# Patient Record
Sex: MALE
[De-identification: names, ages, dates, MRNs, and addresses within clinical notes are randomized per-mention and may not be internally consistent; named-entity substitution may affect disease eponyms.]

---

## 2018-07-16 ENCOUNTER — HOSPITAL ENCOUNTER (OUTPATIENT)
Dept: HOSPITAL 5 - CT | Age: 63
Discharge: HOME | End: 2018-07-16
Attending: SURGERY
Payer: MEDICAID

## 2018-07-16 DIAGNOSIS — I70.213: Primary | ICD-10-CM

## 2018-07-16 LAB — BUN SERPL-MCNC: 15 MG/DL (ref 9–20)

## 2018-07-16 PROCEDURE — 82565 ASSAY OF CREATININE: CPT

## 2018-07-16 PROCEDURE — 36415 COLL VENOUS BLD VENIPUNCTURE: CPT

## 2018-07-16 PROCEDURE — 84520 ASSAY OF UREA NITROGEN: CPT

## 2018-07-16 PROCEDURE — 75635 CT ANGIO ABDOMINAL ARTERIES: CPT

## 2018-07-16 NOTE — CAT SCAN REPORT
FINAL REPORT



EXAM:  CT ANGIO ABD/FEMORAL ABD AORTA



HISTORY:  ATHEROSCLEROSIS OF NATIVE ARTERIES OF EXTREMITIES WITH 



TECHNIQUE:  CT angiography of the abdomen/pelvis with CT arterial

runoff of lower extremities. IV contrast was administered. Axial

images and coronal and sagittal reformatted images were obtained.



PRIORS:  None.



FINDINGS:  

There are coronary artery atherosclerotic calcifications.

There is some dependent atelectasis at the lung bases.

The visualized liver, spleen, pancreas, adrenal glands and

kidneys demonstrate no significant abnormalities.

There is no evidence of intestinal obstruction.

The appendix is not specifically identified. 





There are aortoiliac atherosclerotic calcifications.

There is infrarenal aortic ectasia with mural thrombus. This

measures only 2.7 cm maximum diameter. 



There are bilateral iliac artery stents. 



There are iliofemoral atherosclerotic calcifications. There is

some stenosis in the left common femoral artery. There is no

significant flow within the right superficial femoral artery.

Flow in the right lower extremity is seen throughout collateral

vessels via the deep femoral artery. There is reconstituted flow

in the lower popliteal artery and into the right calf

trifurcation. Adequate flow is seen within dorsalis pedis,

posterior tibial and peroneal arteries of the right lower

extremity.



On the left side there is patency of a left femoral popliteal

graft. The native femoral artery is atherosclerotic and occluded.

Flow at the calf arterial trifurcation is attenuated. Flow within

anterior tibial and peroneal arteries is diminished but flow in

the left posterior tibial artery is maintained. There is little

flow seen in the dorsalis pedis. 



IMPRESSION:  

Aortoiliac and lower extremity atherosclerotic disease.



Right-sided occluded superficial femoral artery with

reconstitution for 2 did flow in the popliteal artery calf

arteries via deep femoral arterial collaterals. 



Left-sided fem-pop graft is patent. Atherosclerotic disease

involving native calf vessels with poor flow in anterior tibial

and peroneal arteries. Left posterior tibial artery flow is

maintained.

## 2018-08-09 ENCOUNTER — HOSPITAL ENCOUNTER (OUTPATIENT)
Dept: HOSPITAL 5 - CATHLABREC | Age: 63
Discharge: HOME | End: 2018-08-09
Attending: SURGERY
Payer: MEDICAID

## 2018-08-09 VITALS — SYSTOLIC BLOOD PRESSURE: 130 MMHG | DIASTOLIC BLOOD PRESSURE: 78 MMHG

## 2018-08-09 DIAGNOSIS — E78.00: ICD-10-CM

## 2018-08-09 DIAGNOSIS — I10: ICD-10-CM

## 2018-08-09 DIAGNOSIS — Z95.1: ICD-10-CM

## 2018-08-09 DIAGNOSIS — Z98.890: ICD-10-CM

## 2018-08-09 DIAGNOSIS — Z79.01: ICD-10-CM

## 2018-08-09 DIAGNOSIS — I70.212: Primary | ICD-10-CM

## 2018-08-09 DIAGNOSIS — F32.9: ICD-10-CM

## 2018-08-09 DIAGNOSIS — Z95.0: ICD-10-CM

## 2018-08-09 DIAGNOSIS — F10.10: ICD-10-CM

## 2018-08-09 DIAGNOSIS — Z72.89: ICD-10-CM

## 2018-08-09 DIAGNOSIS — I25.10: ICD-10-CM

## 2018-08-09 DIAGNOSIS — E78.5: ICD-10-CM

## 2018-08-09 DIAGNOSIS — Z83.3: ICD-10-CM

## 2018-08-09 DIAGNOSIS — I25.2: ICD-10-CM

## 2018-08-09 DIAGNOSIS — Z82.49: ICD-10-CM

## 2018-08-09 DIAGNOSIS — Z79.899: ICD-10-CM

## 2018-08-09 DIAGNOSIS — Z90.49: ICD-10-CM

## 2018-08-09 DIAGNOSIS — F17.210: ICD-10-CM

## 2018-08-09 LAB
APTT BLD: 30.1 SEC. (ref 24.2–36.6)
BASOPHILS # (AUTO): 0 K/MM3 (ref 0–0.1)
BASOPHILS NFR BLD AUTO: 0.6 % (ref 0–1.8)
BUN SERPL-MCNC: 17 MG/DL (ref 9–20)
BUN/CREAT SERPL: 24 %
CALCIUM SERPL-MCNC: 9.1 MG/DL (ref 8.4–10.2)
EOSINOPHIL # BLD AUTO: 0.2 K/MM3 (ref 0–0.4)
EOSINOPHIL NFR BLD AUTO: 2.6 % (ref 0–4.3)
HCT VFR BLD CALC: 49.6 % (ref 35.5–45.6)
HEMOLYSIS INDEX: 8
HGB BLD-MCNC: 17.3 GM/DL (ref 11.8–15.2)
INR PPP: 0.95 (ref 0.87–1.13)
LYMPHOCYTES # BLD AUTO: 2.9 K/MM3 (ref 1.2–5.4)
LYMPHOCYTES NFR BLD AUTO: 36.5 % (ref 13.4–35)
MCH RBC QN AUTO: 31 PG (ref 28–32)
MCHC RBC AUTO-ENTMCNC: 35 % (ref 32–34)
MCV RBC AUTO: 90 FL (ref 84–94)
MONOCYTES # (AUTO): 0.6 K/MM3 (ref 0–0.8)
MONOCYTES % (AUTO): 7.4 % (ref 0–7.3)
PLATELET # BLD: 146 K/MM3 (ref 140–440)
RBC # BLD AUTO: 5.51 M/MM3 (ref 3.65–5.03)

## 2018-08-09 PROCEDURE — 85610 PROTHROMBIN TIME: CPT

## 2018-08-09 PROCEDURE — C1887 CATHETER, GUIDING: HCPCS

## 2018-08-09 PROCEDURE — 85730 THROMBOPLASTIN TIME PARTIAL: CPT

## 2018-08-09 PROCEDURE — C1894 INTRO/SHEATH, NON-LASER: HCPCS

## 2018-08-09 PROCEDURE — C1769 GUIDE WIRE: HCPCS

## 2018-08-09 PROCEDURE — 36415 COLL VENOUS BLD VENIPUNCTURE: CPT

## 2018-08-09 PROCEDURE — 37220: CPT

## 2018-08-09 PROCEDURE — 76937 US GUIDE VASCULAR ACCESS: CPT

## 2018-08-09 PROCEDURE — 80048 BASIC METABOLIC PNL TOTAL CA: CPT

## 2018-08-09 PROCEDURE — C1725 CATH, TRANSLUMIN NON-LASER: HCPCS

## 2018-08-09 PROCEDURE — 75710 ARTERY X-RAYS ARM/LEG: CPT

## 2018-08-09 PROCEDURE — 75625 CONTRAST EXAM ABDOMINL AORTA: CPT

## 2018-08-09 PROCEDURE — 85025 COMPLETE CBC W/AUTO DIFF WBC: CPT

## 2018-08-09 RX ADMIN — FENTANYL CITRATE ONE MCG: 50 INJECTION, SOLUTION INTRAMUSCULAR; INTRAVENOUS at 14:50

## 2018-08-09 RX ADMIN — FENTANYL CITRATE ONE MCG: 50 INJECTION, SOLUTION INTRAMUSCULAR; INTRAVENOUS at 15:03

## 2018-08-09 RX ADMIN — MIDAZOLAM ONE MG: 1 INJECTION INTRAMUSCULAR; INTRAVENOUS at 14:50

## 2018-08-09 RX ADMIN — MIDAZOLAM ONE MG: 1 INJECTION INTRAMUSCULAR; INTRAVENOUS at 15:03

## 2018-08-09 NOTE — HISTORY AND PHYSICAL REPORT
History of Present Illness


Date of examination: 18


History of present illness: 








Dominic Hunt





Date of visit:  2018


:  1955    Age:  63 yrs.


Medical record number:  [id number]    


Account number:  [id number]


___


CHIEF COMPLAINTS


 Followup of Peripheral Vascular Disease, Unspecified


___


ALLERGIES


 No Known Drug Allergies


___


MEDICATIONS


 1. atorvastatin 80 mg tablet, 1 by mouth daily


 2. bupropion HCl  mg 24 hr tablet, extended release, 1 by mouth daily


 3. diphenhydramine 50 mg capsule, 2 by mouth daily


 4. hydrochlorothiazide 25 mg tablet, 1 by mouth daily


 5. lisinopril 10 mg tablet, 1 by mouth daily


 6. metoprolol succinate  mg tablet,extended release 24 hr, 1 by mouth 

daily


 7. mirtazapine 30 mg tablet, 1 by mouth daily


 8. phenytoin sodium extended 200 mg capsule, 1 by mouth twice daily


 9. Seroquel 200 mg tablet, 1 by mouth daily


 10. Vimpat 200 mg tablet, 1 by mouth daily in the am and 300mg at night


___


DIAGNOSTIC TEST DATES


 (2017) nuclear study   Note:  moderate inferior infarct, minimal 

inferoapical reji-infarct ischemia 46477


 (2016) echocardiogram   Note:  EF 49%, mild AI, 28031


__


HISTORY OF PRESENT ILLNESS


Follow up CTA


Still has severe pain in left buttocks radiating to thigh, describes as 

disabling


Prior multiple vascular procedures in [place]


Minimal right calf pain with walking


___


REVIEW OF SYSTEMS


General/Constitutional:  denies recent weight loss, denies recent weight gain, 

denies fever, denies chills, denies change in exercise tolerance


Integumentary:  denies change in hair or nails, denies rashes, denies skin 

lesions


Eyes:  denies diplopia, denies visual field defects, denies blurred vision, 

denies eye pain, denies discharge


Ears, Nose, Mouth, Throat:  denies hearing loss, denies epistaxis, denies 

hoarseness, denies difficulty speaking


Respiratory:  denies dyspnea, denies cough, denies wheezing, denies hemoptysis, 

denies orthopnea, denies PND


Cardiovascular:  denies palpitations, denies chest pain, denies peripheral edema

, denies syncope, denies claudication


Gastrointestinal:  denies ulcer disease, denies hematochezia and denies melena


Musculoskeletal:  denies venous insufficiency, denies arthritic symptoms, 

denies back problems


Neurological:  seizure disorder


Psychiatric:  depression


Endocrine:  denies heat/cold intolerance, denies polydipsia, denies polyuria


Hematological/Immunologic:  denies bleeding disorder


___


PAST HISTORY 


Past Medical Illnesses:  seizures, hx of depression;  Cardiovascular Illnesses: 

 hyperlipidemia, hypertension, CABG . AICD  for VT, PVD bilateral fem-

pop ;  Infectious Diseases:  no significant history of infectious disease;  

Surgical Procedures:  triple bypass , pacemaker , pad bypass  (both 

legs);  Trauma History:  no history of significant physical trauma;  NYHA 

Classification:  I;  Cardiology Procedures-Noninvasive:  EKG May 2017, lexiscan 

perfusion study 2017;  Device Implants:  AICD Medtronic Sprint 

Quattro 11/29/10;  Left Ventricular Ejection Fraction:  LVEF of 50% documented 

via nuclear study on 2017


;  Peripheral Vascular Procedures:  bilateral fem-pop ;  ___


CARDIAC RISK FACTOR SCREENING 


Tobacco Abuse: Yes;  Family History of Heart Disease: No;  Hyperlipidemia: Yes;

  Hypertension: Yes;  Diabetes Mellitus: No;  Prior History of Heart Disease: No

;  Obesity: No;  Sedentary Life Style: No;  Age: Yes;  Postmenopausal Female: No

;  


___


SOCIAL HISTORY


Alcohol Use:  history of alcohol abuse


Smoking/Tobacco Use:   smokes cigarettes 1-2 ppd 


Diet:  regular diet without modifications


Caffeine Use:  2-3 pots of coffee per day


Exercise:  no routine exercise program


Illicit Drug Use:  denies the abuse of prescription or nonprescription drugs





PHYSICAL EXAMINATION





VITAL SIGNS:


Blood Pressure-  120/70 Sitting, Left arm, large cuff


Pulse-                    68/min.


Respirations-       16/min.


Weight-                 227.58420 lbs.


Height-                  75.00"


BMI:                        28











Constitutional:  cooperative, alert and oriented, well developed, well nourished

, in no acute distress


Skin:  warm and dry to touch, no apparent skin lesions, no apparent masses noted


Head:  normocephalic, non tender, no palpable masses


Eyes:  EOMS Intact, conjunctivae and lids unremarkable


ENT:  ears unremarkable, good dentition


Neck:  no palpable masses or adenopathy, no thyromegaly, JVP normal, carotid 

pulses are full and equal bilaterally without bruits


Chest:  normal symmetry, no tenderness to palpation, normal respiratory 

excursion, no intercostal retraction, no use of accessory muscles, normal 

diaphragmatic excursion, clear to auscultation


Cardiac:  regular rhythm, S1 normal, S2 normal, no S3 or S4, no murmurs, no 

gallops, no rubs detected


Abdomen:   soft, bowel sounds normoactive, no masses, non-tender


Peripheral Pulses:  nonpalpable left femoral pulse, palpable right femoral 

pulse 2+


Extremities & Back:  both feet warm, well-perfused, no edema


Psychiatric:  appropriate mood, memory and judgment


Neurological:  no gross motor or sensory deficits noted


___


IMPRESSIONS/PLAN





1. Peripheral Vascular Disease, Unspecified


2. Nicotine Dependence, Cigarettes, With Other Nicotine-induced Disorders


3. Atherosclerosis Of Native Arteries Of Extremities With Intermittent 

Claudication, Bilateral Legs


4. Essential (primary) Hypertension


5. Atherosclerosis Of Coronary Artery Bypass Graft(s) Without Angina Pectoris


6. Presence Of Automatic (implantable) Cardiac Defibrillator


7. Atherosclero of native arter of extrem rest pain solitario legs





Left butock severe claudication:  No femoral pulse, c/w aortoiliac disease on 

left.  CTA shows diffuse iliac disease, based on exam this is causing his 

symptoms on left.  Scheduled left leg angiogram, d/w him risks/benefits/

alternatives (meds only), will proceed with procedure at [facility].


___


TODAYS ORDERS


 1. Patient Electronic Access, Today


 2. Left Lower Angiogram, 3 days, I70.223


                     


___


Clinic Physician:  Domo Meehan MD MD





Referring Physician: TAYLOR AGUIRRE








Medications and Allergies


 Allergies











Allergy/AdvReac Type Severity Reaction Status Date / Time


 


No Known Allergies Allergy   Unverified 18 08:38

## 2018-08-10 NOTE — PROCEDURE NOTE
Date of procedure: 08/09/18


Pre-op diagnosis: Left Hip Severe Claudication, H/o Iliac stenting


Post-op diagnosis: same


Procedure: 





1.  Ultrasound-guided access of right CFA


2.  Abdominal aortogram with left lower extremity runoff


3.  Balloon angioplasty of left JUANJOSE in-stent stenosis with 8 mm balloon.





Description:





The risks, benefits, complications, treatment options, and expected outcomes 

were discussed with the patient. The patient concurred with the proposed plan, 

giving informed consent. Patient was brought to the cath lab after IV hydration 

was begun. The patient was sedated with Fentanyl and Versed and then prepped 

and draped in the usual manner.  Under ultrasound guidance and using the 

modified Seldinger access technique, I gained access to the common femoral 

artery and this was eventually upsized to a 6F Armenian sheath.  Omniflush 

Catheter was placed in the abdominal aorta over wire and an aortogram was 

performed.  Runoff pictures were obtained by selecting the contralateral iliac 

system with Bentson wire and Omniflush catheter.  This showed the above.  I 

placed an advantage wire down the lower extremity and placed a 6F Destination 

sheath over the bifurcation then gave the patient 5000 U IV heparin.  I then 

crossed the lesion with wire and glidecatheter and confirmed I was intraluminal 

with an angiogram.  I then treated the lesion with a 8 mm balloon at nominal 

pressure for 1 minute.  Repeat angiogram showed a good result and no 

embolization.    I did a sheath angiogram of the contralateral side which 

confirmed a good access location and the sheath was removed with manual 

pressure applied for 20 minutes.  There was no bleeding afterwards.  The 

patient tolerated the procedure well and there were no complications.





Anesthesia: MAC, local


Surgeon: DEWAYNE ROWE


Estimated blood loss: minimal


Pathology: none


Condition: stable


Disposition: observation

## 2022-06-23 ENCOUNTER — HOSPITAL ENCOUNTER (OUTPATIENT)
Dept: HOSPITAL 5 - XRAY | Age: 67
Discharge: HOME | End: 2022-06-23
Attending: ORTHOPAEDIC SURGERY
Payer: MEDICARE

## 2022-06-23 DIAGNOSIS — S42.92XS: ICD-10-CM
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
## 2022-06-23 DIAGNOSIS — S42.292P: Primary | ICD-10-CM

## 2022-06-23 NOTE — XRAY REPORT
LEFT SHOULDER 3 VIEWS



INDICATION / CLINICAL INFORMATION: S42.92XA FRACTURE OF LEFT SHOULDER GIRDLE S42.92XS.



COMPARISON: None available.

 

FINDINGS:



BONES / JOINT(S): There is a chronic appearing comminuted fracture of the left humeral head and neck.
 There is a separate fracture fragment involving the superior half of the humeral head with lateral r
otation present. There is significant overriding of the distal fracture fragments. There is at least 
incomplete osseous union present. No dislocation.

SOFT TISSUES: No significant abnormality.



ADDITIONAL FINDINGS: The visualized left lung is clear. There is a median sternotomy.



IMPRESSION: Chronic comminuted fractures of the humeral head and neck with incomplete union.



Signer Name: Seymour Perez MD 

Signed: 6/23/2022 10:15 AM

Workstation Name: [id number]

## 2022-08-10 LAB
BUN SERPL-MCNC: 14 MG/DL (ref 9–20)
BUN/CREAT SERPL: 18 %
CALCIUM SERPL-MCNC: 9 MG/DL (ref 8.4–10.2)
HCT VFR BLD CALC: 47.4 % (ref 35.5–45.6)
HEMOLYSIS INDEX: 7
HGB BLD-MCNC: 16.1 GM/DL (ref 11.8–15.2)
MCHC RBC AUTO-ENTMCNC: 34 % (ref 32–34)
MCV RBC AUTO: 98 FL (ref 84–94)
PLATELET # BLD: 160 K/MM3 (ref 140–440)
RBC # BLD AUTO: 4.83 M/MM3 (ref 3.65–5.03)

## 2022-08-10 NOTE — ANESTHESIA CONSULTATION
Anesthesia Consult and Med Hx





- Airway


Anesthetic Teeth Evaluation: Edentulous


ROM Head & Neck: Adequate


Mental/Hyoid Distance: Adequate


Mallampati Class: Class II


Intubation Access Assessment: Probably Good





- Pre-Operative Health Status


ASA Pre-Surgery Classification: ASA4


Proposed Anesthetic Plan: General (+ART LINE)


Nerve Block: IS





- Pulmonary


Hx Smoking: Yes (1-2 PPD X 50 YRS)


Hx Asthma: No


Hx Respiratory Symptoms: No (+2FS)


COPD: No


Hx Pneumonia: No


Hx Sleep Apnea: Yes (DX SLEEP APNEA , NO CPAP USE)





- Cardiovascular System


Hx Hypertension: Yes (X 10 YRS. EF 20-25%)


Hx Coronary Artery Disease: Yes


Hx Heart Attack/AMI: Yes (2017. CABG 2007)


Hx Cardia Arrhythmia: Yes (A-Fib)


Hx Pacemaker: Yes


Hx Internal Defibrillator: Yes (Recent generator change; denies it being 

discharged)


Hx Peripheral Vascular Disease: Yes (STENTS ELLIOT LEGS and CAROTID STENOSIS)





- Central Nervous System


Hx Seizures: Yes (LAST SEIZURE 5 MONTHS AGO)


Hx Psychiatric Problems: Yes (Anxiety/Depression/Schizophrenia)





- Gastrointestinal


Hx Gastroesophageal Reflux Disease: Yes





- Endocrine


Hx End Stage Renal Disease: No





- Hematic


Hx Anemia: No


Hx Sickle Cell Disease: No





- Other Systems


Hx Alcohol Use: Yes (HX ABUSE-SOBER ON AND OFF X 5 YRS)


Hx Substance Use: Yes (HX METH ADDICTION-CLEAN X 15 YRS)


Hx Cancer: No





- Additional Comments


Anesthesia Medical History Comments: +Cardiac clearance

## 2022-08-11 ENCOUNTER — HOSPITAL ENCOUNTER (INPATIENT)
Dept: HOSPITAL 5 - OR | Age: 67
LOS: 8 days | Discharge: HOME | DRG: 483 | End: 2022-08-19
Attending: INTERNAL MEDICINE | Admitting: ORTHOPAEDIC SURGERY
Payer: MEDICARE

## 2022-08-11 DIAGNOSIS — G40.909: ICD-10-CM

## 2022-08-11 DIAGNOSIS — Z71.6: ICD-10-CM

## 2022-08-11 DIAGNOSIS — E78.5: ICD-10-CM

## 2022-08-11 DIAGNOSIS — Z20.822: ICD-10-CM

## 2022-08-11 DIAGNOSIS — A41.9: ICD-10-CM

## 2022-08-11 DIAGNOSIS — I48.92: ICD-10-CM

## 2022-08-11 DIAGNOSIS — E87.1: ICD-10-CM

## 2022-08-11 DIAGNOSIS — I48.20: ICD-10-CM

## 2022-08-11 DIAGNOSIS — Z95.1: ICD-10-CM
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
## 2022-08-11 DIAGNOSIS — I25.2: ICD-10-CM

## 2022-08-11 DIAGNOSIS — G47.33: ICD-10-CM

## 2022-08-11 DIAGNOSIS — Z95.810: ICD-10-CM

## 2022-08-11 DIAGNOSIS — I25.10: ICD-10-CM

## 2022-08-11 DIAGNOSIS — I11.0: ICD-10-CM

## 2022-08-11 DIAGNOSIS — F20.9: ICD-10-CM

## 2022-08-11 DIAGNOSIS — T84.028A: Primary | ICD-10-CM

## 2022-08-11 DIAGNOSIS — F32.A: ICD-10-CM
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
## 2022-08-11 DIAGNOSIS — J18.9: ICD-10-CM

## 2022-08-11 DIAGNOSIS — I73.9: ICD-10-CM

## 2022-08-11 DIAGNOSIS — I95.81: ICD-10-CM

## 2022-08-11 DIAGNOSIS — Z88.8: ICD-10-CM

## 2022-08-11 DIAGNOSIS — M19.112: ICD-10-CM

## 2022-08-11 DIAGNOSIS — I50.42: ICD-10-CM

## 2022-08-11 DIAGNOSIS — Z87.891: ICD-10-CM

## 2022-08-11 DIAGNOSIS — R65.21: ICD-10-CM

## 2022-08-11 DIAGNOSIS — Z82.49: ICD-10-CM

## 2022-08-11 PROCEDURE — C1713 ANCHOR/SCREW BN/BN,TIS/BN: HCPCS

## 2022-08-11 PROCEDURE — U0003 INFECTIOUS AGENT DETECTION BY NUCLEIC ACID (DNA OR RNA); SEVERE ACUTE RESPIRATORY SYNDROME CORONAVIRUS 2 (SARS-COV-2) (CORONAVIRUS DISEASE [COVID-19]), AMPLIFIED PROBE TECHNIQUE, MAKING USE OF HIGH THROUGHPUT TECHNOLOGIES AS DESCRIBED BY CMS-2020-01-R: HCPCS

## 2022-08-11 PROCEDURE — 80048 BASIC METABOLIC PNL TOTAL CA: CPT

## 2022-08-11 PROCEDURE — C1776 JOINT DEVICE (IMPLANTABLE): HCPCS

## 2022-08-11 PROCEDURE — 86901 BLOOD TYPING SEROLOGIC RH(D): CPT

## 2022-08-11 PROCEDURE — 85018 HEMOGLOBIN: CPT

## 2022-08-11 PROCEDURE — G0378 HOSPITAL OBSERVATION PER HR: HCPCS

## 2022-08-11 PROCEDURE — 86900 BLOOD TYPING SEROLOGIC ABO: CPT

## 2022-08-11 PROCEDURE — 84484 ASSAY OF TROPONIN QUANT: CPT

## 2022-08-11 PROCEDURE — 85025 COMPLETE CBC W/AUTO DIFF WBC: CPT

## 2022-08-11 PROCEDURE — P9045 ALBUMIN (HUMAN), 5%, 250 ML: HCPCS

## 2022-08-11 PROCEDURE — 82962 GLUCOSE BLOOD TEST: CPT

## 2022-08-11 PROCEDURE — 36415 COLL VENOUS BLD VENIPUNCTURE: CPT

## 2022-08-11 PROCEDURE — L1830 KO IMMOB CANVAS LONG PRE OTS: HCPCS

## 2022-08-11 PROCEDURE — 85014 HEMATOCRIT: CPT

## 2022-08-11 PROCEDURE — 86850 RBC ANTIBODY SCREEN: CPT

## 2022-08-11 PROCEDURE — 94760 N-INVAS EAR/PLS OXIMETRY 1: CPT

## 2022-08-11 PROCEDURE — 0RRK00Z REPLACEMENT OF LEFT SHOULDER JOINT WITH REVERSE BALL AND SOCKET SYNTHETIC SUBSTITUTE, OPEN APPROACH: ICD-10-PCS | Performed by: ORTHOPAEDIC SURGERY

## 2022-08-11 PROCEDURE — 85027 COMPLETE CBC AUTOMATED: CPT

## 2022-08-11 PROCEDURE — 93005 ELECTROCARDIOGRAM TRACING: CPT

## 2022-08-11 PROCEDURE — 36620 INSERTION CATHETER ARTERY: CPT

## 2022-08-11 RX ADMIN — SODIUM CHLORIDE, SODIUM LACTATE, POTASSIUM CHLORIDE, AND CALCIUM CHLORIDE SCH MLS/HR: .6; .31; .03; .02 INJECTION, SOLUTION INTRAVENOUS at 20:45

## 2022-08-11 NOTE — POST ANESTHESIA EVALUATION
- Post Anesthesia Evaluation


Patient Participated: Yes


Airway Patent: Yes


Stable Respiratory Function: Yes


Nausea/Vomiting: No


Temp > 96.8F: Yes


Pain Manageable: Yes


Adequeate Hydration: Yes


Anesthesia Complications: No


Block Receding Appropriately: Yes


Patient on Ventilator: No


Other Comments: Neuro intact; moves all four extremities, A&O

## 2022-08-11 NOTE — XRAY REPORT
Single frontal image of the left shoulder



INDICATION:  post op evaluation.





IMPRESSION:  Satisfactory postoperative appearance of the left shoulder arthroplasty with no complica
tion identified. Unchanged fracture fragments. Expected postoperative alignment.



Signer Name: Abhijeet Reid MD 

Signed: 8/11/2022 6:08 PM

Workstation Name: [email or web address]

## 2022-08-11 NOTE — PROCEDURE NOTE
Date of procedure: 08/11/22


Pre-op diagnosis: post-traumatic arthritis left shoulder


Post-op diagnosis: same


Procedure: 


Reverse [left] total shoulder replacement





Procedure


The patient was brought to the OR after being given a scalene nerve block in 

preop holding.  He was placed in the OR table in the supine position following 

induction intubation by anesthesia patient was placed in a beachchair position 

with the [left] shoulder suspended off the side.  The [left] shoulder was then 

prepped and draped in the usual sterile manner.  A timeout procedure was done to

 identify the patient and the correct operative site.  Utilizing a deltopectoral

 approach incision was carried from the coracoid process proximally distally to 

the insertion of the deltoid was taken to develop tissue planes it was noted to 

patient had a previous open rotator cuff repair therefore a small fragmentary b

ranch of the cephalic vein was seen and followed the deltoid muscle was split 

using digital palpation next the Brown retractor was placed around the deltoid 

to expose more of the proximal humerus patient was noted to have contracted 

malunited proximal humeral fracture with proximal fragment facing directly 

posterior. Following meticulous dissection the head fragment was delivered 

piecemeal until the glenoid articular surface exposed sequential reaming done up

 to a size 38 glenosphere,  rotator cuff tendons were  atrophic and adhered to 

the proximal fragment with the significant atrophy also noted in the 

subscapularis tendon.  With the arm in external rotation the subscapularis 

tendon was incised as well as the anterior capsule there of was no biceps tendon

 seen next the following capsulotomy the proximal humerus of was prepared for 

osteotomy using the external alignment guide with 20 of external rotation the 

proximal humerus was resected next the humeral shaft was reamed and broached to 

a #10 stem.  The stem was then protected proximally this is followed by 

retraction of the proximal humerus to visualize the glenoid articular surface.  

Using the modular glenoid plate drill guide 2 mm  hole was placed this is 

followed by progressive reaming to a size 4, next glenoid plate hole was made 

using the drill guide.  The glenoid plate would bone graft was then inserted 

onto the glenoid using the insertion plate and mallet.  Next the fixation screws

 were applied beginning with the superior, inferior, and the 2 inferior medial 

and lateral screws.  Locking caps were applied to each screw head next the 

Glenosphere was attached to the glenohumeral plate and secured by way of the 

glenosphere locking screw.  Next the attention was turned to sizing the humeral 

liner.  A +0 humeral adapter tray was applied to the humeral stem this was 

inserted using the reverse shoulder for the finding screw kit.  Next a trial 

38mm +0 humeral liner was inserted.  The shoulder was reduced and was taken 

through a range of motion and found to be stable.  The shoulder was then 

dislocated trial liner removed and the final liner inserted.  Again the shoulder

 was taken through a range of motion and was found to be stable following this 

the wound was copiously irrigated.  The subscapularis tendon was repaired using 

#1 Vicryl.  The deltopectoral pectoral fascia was reapproximated the skin was cl

osed using Zipline suture, routine postoperative dressings were applied.  

Patient tolerated the procedure there were no complications








Anesthesia: MAC, regional


Surgeon: SONIA CARDENAS (Wilbur Cisnerosing, 1st assist)


Estimated blood loss: other (200cc)


Pathology: none


Condition: stable


Disposition: PACU

## 2022-08-12 LAB
BASOPHILS # (AUTO): 0 K/MM3 (ref 0–0.1)
BASOPHILS NFR BLD AUTO: 0.1 % (ref 0–1.8)
BUN SERPL-MCNC: 13 MG/DL (ref 9–20)
BUN/CREAT SERPL: 19 %
CALCIUM SERPL-MCNC: 8.3 MG/DL (ref 8.4–10.2)
EOSINOPHIL # BLD AUTO: 0 K/MM3 (ref 0–0.4)
EOSINOPHIL NFR BLD AUTO: 0 % (ref 0–4.3)
HCT VFR BLD CALC: 34.3 % (ref 35.5–45.6)
HCT VFR BLD CALC: 36.9 % (ref 35.5–45.6)
HEMOLYSIS INDEX: 0
HGB BLD-MCNC: 11.7 GM/DL (ref 11.8–15.2)
HGB BLD-MCNC: 12.2 GM/DL (ref 11.8–15.2)
LYMPHOCYTES # BLD AUTO: 1.3 K/MM3 (ref 1.2–5.4)
LYMPHOCYTES NFR BLD AUTO: 12.9 % (ref 13.4–35)
MCHC RBC AUTO-ENTMCNC: 33 % (ref 32–34)
MCV RBC AUTO: 99 FL (ref 84–94)
MONOCYTES # (AUTO): 0.8 K/MM3 (ref 0–0.8)
MONOCYTES % (AUTO): 8 % (ref 0–7.3)
PLATELET # BLD: 135 K/MM3 (ref 140–440)
RBC # BLD AUTO: 3.74 M/MM3 (ref 3.65–5.03)

## 2022-08-12 RX ADMIN — DOCUSATE SODIUM SCH MG: 100 CAPSULE, LIQUID FILLED ORAL at 10:22

## 2022-08-12 RX ADMIN — MORPHINE SULFATE PRN MG: 2 INJECTION, SOLUTION INTRAMUSCULAR; INTRAVENOUS at 17:00

## 2022-08-12 RX ADMIN — PRAZOSIN HYDROCHLORIDE SCH MG: 1 CAPSULE ORAL at 10:18

## 2022-08-12 RX ADMIN — MORPHINE SULFATE PRN MG: 2 INJECTION, SOLUTION INTRAMUSCULAR; INTRAVENOUS at 22:00

## 2022-08-12 RX ADMIN — DOCUSATE SODIUM SCH MG: 100 CAPSULE, LIQUID FILLED ORAL at 21:59

## 2022-08-12 RX ADMIN — KETOROLAC TROMETHAMINE PRN MG: 30 INJECTION, SOLUTION INTRAMUSCULAR at 14:17

## 2022-08-12 RX ADMIN — PANTOPRAZOLE SODIUM SCH MG: 40 TABLET, DELAYED RELEASE ORAL at 10:14

## 2022-08-12 RX ADMIN — LACOSAMIDE SCH MG: 100 TABLET, FILM COATED ORAL at 10:18

## 2022-08-12 RX ADMIN — ENOXAPARIN SODIUM SCH MG: 100 INJECTION SUBCUTANEOUS at 10:18

## 2022-08-12 RX ADMIN — SODIUM CHLORIDE, SODIUM LACTATE, POTASSIUM CHLORIDE, AND CALCIUM CHLORIDE SCH MLS/HR: .6; .31; .03; .02 INJECTION, SOLUTION INTRAVENOUS at 10:23

## 2022-08-12 RX ADMIN — KETOROLAC TROMETHAMINE PRN MG: 30 INJECTION, SOLUTION INTRAMUSCULAR at 20:36

## 2022-08-12 RX ADMIN — LACOSAMIDE SCH MG: 100 TABLET, FILM COATED ORAL at 21:59

## 2022-08-12 RX ADMIN — ENOXAPARIN SODIUM SCH: 100 INJECTION SUBCUTANEOUS at 07:56

## 2022-08-12 NOTE — PROGRESS NOTE
<DALILA CESAR - Last Filed: 22 13:55>





Assessment and Plan


Assessment and plan: 





This is a 67-year-old male with A. fib, heart failure with reduced EF, AICD in 

situ, schizophrenia, severe depression, HTN, HLD, PVD, OSVALDO, nicotine abuser 

admitted s/p total left shoulder replacement on  for hypotension





Neuro: h/o schizophrenia, severe depression, seizure disorder


-Continue fluoxetine, zyprexa, vimpat


-Reorientation as needed


-Maintain sleep-wake cycle


-aspiration/seizure precautions


-As needed analgesia


-PT/OT consulted, appreciate recommendation





Cardiac: h/o HTN, HFrEF with AICD in stiu, afib, HLD, PVD s/p stents to BLE


-Hold home eliquis


-Resume home metoprolol


-Continue lipitor


-Blood pressure monitoring per protocol


-s/p vazquez-synephrine


-CCM consulted, appreciate recommendations





Respiratory: h/o OSVALDO, smoker


-Smoking cessation counseling


-Pulmonary hygiene


-IS to bedside


-Outpatient pulm follow up


-SPO2 monitoring per protocol





GI: NAD


-24 hours + 820 ml


-PPI


-Cardiac diet


-BR: colace





: NAD


-Renally dose medications


-Avoid nephrotoxic medications





ID: NAD


-Monitor WBC and temperature curve





Endo: NAD


-Avoid hypoglycemia





Heme: NAD


-Trend CBC


-Transfuse hemoglobin less than 7


-SCDs to BLE while in bed











The high probability of a clinically significant, sudden or life threatening det

erioration of the [cardio] system(s) required my full and direct attention, 

intervention and personal management. The aggregate critical care time was [60] 

minutes. This time is in addition to time spent performing reported procedures 

but includes the following: 





[x] Data Review and interpretation 





[x] Patient assessment and monitoring of vital signs 





[x] Documentation 





[x] Medication orders and management


Disposition Plan: transfer to floor


Total Time Spent with Patient (Minutes): 60





History


Interval history: 


This is a 67-year-old male with A. fib, heart failure with reduced EF (20 to 

25%) s/p AICD, schizophrenia, severe depression, hypertension, hyperlipidemia, 

PVD s/p stents to bilateral lower extremities, OSVALDO, smoker who had a total left 

shoulder replacement on .  Patient was hypotensive and was started on low-

dose Vazquez-Synephrine and transferred to the ICU for closer monitoring.





Hospital course to date:





: Patient has MIVF running, no acute vents reported overnight.  Systolic 

blood pressures ranging from upper 90s to 110s. Transfer to the floor today. 

Remove jose, send stat h/h due to bleeding with getting OOB with PT. 











Hospitalist Physical





- Constitutional


Vitals: 


                                        











Temp Pulse Resp BP Pulse Ox


 


 96.9 F L  120 H  18   115/79   97 


 


 22 04:39  22 06:41  22 06:41  22 06:41  22 08:18











General appearance: Present: no acute distress, well-nourished





- EENT


Eyes: Present: PERRL, EOM intact


ENT: clear oral mucosa, dentition normal





- Neck


Neck: Present: normal ROM





- Respiratory


Respiratory effort: normal


Respiratory: bilateral: CTA, diminished





- Cardiovascular


Rhythm: regular


Heart Sounds: Present: S1 & S2.  Absent: systolic murmur, diastolic murmur





- Extremities


Extremities: no ischemia, pulses intact, pulses symmetrical, No edema, normal 

temperature, normal color


Peripheral Pulses: within normal limits





- Abdominal


General gastrointestinal: soft, non-tender, non-distended, normal bowel sounds





- Integumentary


Integumentary: Present: warm, dry





- Psychiatric


Psychiatric: cooperative





- Neurologic


Neurologic: CNII-XII intact, no focal deficits, moves all extremities





- Allied Health


Allied health notes reviewed: nursing, PT, OT





HEART Score





- HEART Score


Age: > 65


Risk factors: 1-2 risk factors


Troponin: < normal limit





- Critical Actions


Critical Actions: 0-3 pts:0.9-1.7%risk of adverse cardiac event.Candidate for 

discharge





Results





- Labs


CBC & Chem 7: 


                                 22 12:50





                                 22 08:20


Labs: 


                             Laboratory Last Values











WBC  10.4 K/mm3 (4.5-11.0)   22  06:00    


 


RBC  3.74 M/mm3 (3.65-5.03)   22  06:00    


 


Hgb  12.2 gm/dl (11.8-15.2)  D 22  06:00    


 


Hct  36.9 % (35.5-45.6)  D 22  06:00    


 


MCV  99 fl (84-94)  H  22  06:00    


 


MCH  33 pg (28-32)  H  22  06:00    


 


MCHC  33 % (32-34)   22  06:00    


 


RDW  14.6 % (13.2-15.2)   22  06:00    


 


Plt Count  135 K/mm3 (140-440)  L  22  06:00    


 


Lymph % (Auto)  12.9 % (13.4-35.0)  L  22  06:00    


 


Mono % (Auto)  8.0 % (0.0-7.3)  H  22  06:00    


 


Eos % (Auto)  0.0 % (0.0-4.3)   22  06:00    


 


Baso % (Auto)  0.1 % (0.0-1.8)   22  06:00    


 


Lymph # (Auto)  1.3 K/mm3 (1.2-5.4)   22  06:00    


 


Mono # (Auto)  0.8 K/mm3 (0.0-0.8)   22  06:00    


 


Eos # (Auto)  0.0 K/mm3 (0.0-0.4)   22  06:00    


 


Baso # (Auto)  0.0 K/mm3 (0.0-0.1)   22  06:00    


 


Seg Neutrophils %  79.0 % (40.0-70.0)  H  22  06:00    


 


Seg Neutrophils #  8.2 K/mm3 (1.8-7.7)  H  22  06:00    


 


Sodium  140 mmol/L (137-145)  D 22  08:20    


 


Potassium  3.9 mmol/L (3.6-5.0)   22  08:20    


 


Chloride  107.8 mmol/L ()  H  22  08:20    


 


Carbon Dioxide  22 mmol/L (22-30)   22  08:20    


 


Anion Gap  14 mmol/L  22  08:20    


 


BUN  13 mg/dL (9-20)   22  08:20    


 


Creatinine  0.7 mg/dL (0.8-1.3)  L  22  08:20    


 


Estimated GFR  > 60 ml/min  22  08:20    


 


BUN/Creatinine Ratio  19 %  22  08:20    


 


Glucose  117 mg/dL ()  H  22  08:20    


 


POC Glucose  134 mg/dL ()  H  22  17:49    


 


Calcium  8.3 mg/dL (8.4-10.2)  L  22  08:20    


 


SARS-CoV-2 (PCR)  Negative  (Negative)   08/10/22  10:30    











Portillo/IV: 


                                        





Voiding Method                   Condom Catheter











Active Medications





- Current Medications


Current Medications: 














Generic Name Dose Route Start Last Admin





  Trade Name Freq  PRN Reason Stop Dose Admin


 


Atorvastatin Calcium  20 mg  22 22:00 





  Atorvastatin 20 Mg Tab  PO  





  QHS [facility]  


 


Baclofen  10 mg  22 05:58 





  Baclofen 10 Mg Tab  PO  





  DAILY PRN  





  Muscle Spasm  


 


Docusate Sodium  100 mg  22 10:00 





  Docusate Sodium 100 Mg Cap  PO  





  BID [facility]  


 


Enoxaparin Sodium  40 mg  22 12:00  22 07:56





  Enoxaparin 40 Mg/0.4 Ml Inj  SUB-Q   Not Given





  QDAY [facility]  


 


Fluoxetine HCl  60 mg  22 10:00 





  Fluoxetine 20 Mg Cap  PO  





  QDAY [facility]  


 


Lactated Ringer's  1,000 mls @ 75 mls/hr  22 10:00  22 20:45





  Lactated Ringers  IV   75 mls/hr





  AS DIRECT DYLAN   Administration


 


Phenylephrine HCl 100 mg/  100 mls @ 3 mls/hr  22 17:30  22 20:15





  Sodium Chloride  IV   0 mcg/min





  TITR DYLAN   0 mls/hr





    Titration





  Protocol  





  50 MCG/MIN  


 


Ibuprofen  600 mg  22 11:00 





  Ibuprofen 600 Mg Tab  PO  





  Q6H PRN  





  Pain, Mild (1-3)  


 


Ketorolac Tromethamine  15 mg  22 11:00 





  Ketorolac 30 Mg/1 Ml Inj  IV  22 10:59 





  Q6H PRN  





  Pain, Moderate (4-6)  


 


Lacosamide  300 mg  22 22:00 





  Lacosamide 100 Mg Tab  PO  





  QHS [facility]  


 


Lacosamide  200 mg  22 10:00 





  Lacosamide 100 Mg Tab  PO  





  QAM DYLAN  


 


Morphine Sulfate  2 mg  22 10:00 





  Morphine 2 Mg/1 Ml Inj  IV  





  Q4H PRN  





  Pain, Moderate (4-6)  


 


Olanzapine  5 mg  22 10:00 





  Olanzapine 5 Mg Tab  PO  





  DAILY DYLAN  


 


Pantoprazole Sodium  40 mg  22 10:00 





  Pantoprazole 40 Mg Tab  PO  





  DAILY [facility]  


 


Prazosin HCl  1 mg  22 10:00 





  Prazosin 1 Mg Cap  PO  





  DAILY [facility]  


 


Sodium Chloride  10 ml  22 10:00 





  Sodium Chloride 0.9% 10 Ml Flush Syringe  IV  22 09:59 





  PRN NR  














<ASHYL PEREZ - Last Filed: 22 11:35>





History


Interval history: 





I saw and evaluated the patient. I agree with the findings and the plan of care 

as documented in the Nurse Practitioner's~note, with the following corrections 

and additions.





Hospitalist Physical





- Constitutional


Vitals: 


                                        











Temp Pulse Resp BP Pulse Ox


 


 97.6 F   92 H  18   96/66   95 


 


 22 10:35  22 14:31  22 10:35  22 14:31  22 10:35














HEART Score





- HEART Score


Troponin: 


                                        











Troponin T  < 0.010 ng/mL (0.00-0.029)   22  22:38    














Results





- Labs


CBC & Chem 7: 


                                 22 05:45





                                 22 05:45


Labs: 


                             Laboratory Last Values











WBC  6.0 K/mm3 (4.5-11.0)   22  05:45    


 


RBC  2.99 M/mm3 (3.65-5.03)  L  22  05:45    


 


Hgb  9.7 gm/dl (11.8-15.2)  L  22  05:45    


 


Hct  28.9 % (35.5-45.6)  L  22  05:45    


 


MCV  97 fl (84-94)  H  22  05:45    


 


MCH  33 pg (28-32)  H  22  05:45    


 


MCHC  34 % (32-34)   22  05:45    


 


RDW  14.5 % (13.2-15.2)   22  05:45    


 


Plt Count  199 K/mm3 (140-440)   22  05:45    


 


Lymph % (Auto)  26.9 % (13.4-35.0)   22  05:45    


 


Mono % (Auto)  10.7 % (0.0-7.3)  H  22  05:45    


 


Eos % (Auto)  2.5 % (0.0-4.3)   22  05:45    


 


Baso % (Auto)  0.5 % (0.0-1.8)   22  05:45    


 


Lymph # (Auto)  1.6 K/mm3 (1.2-5.4)   22  05:45    


 


Mono # (Auto)  0.6 K/mm3 (0.0-0.8)   22  05:45    


 


Eos # (Auto)  0.2 K/mm3 (0.0-0.4)   22  05:45    


 


Baso # (Auto)  0.0 K/mm3 (0.0-0.1)   22  05:45    


 


Seg Neutrophils %  59.4 % (40.0-70.0)   22  05:45    


 


Seg Neutrophils #  3.6 K/mm3 (1.8-7.7)   22  05:45    


 


Sodium  139 mmol/L (137-145)   22  05:45    


 


Potassium  4.0 mmol/L (3.6-5.0)  D 22  05:45    


 


Chloride  106.1 mmol/L ()   22  05:45    


 


Carbon Dioxide  21 mmol/L (22-30)  L  22  05:45    


 


Anion Gap  16 mmol/L  22  05:45    


 


BUN  12 mg/dL (9-20)   22  05:45    


 


Creatinine  0.7 mg/dL (0.8-1.3)  L  22  05:45    


 


Estimated GFR  > 60 ml/min  22  05:45    


 


BUN/Creatinine Ratio  17 %  22  05:45    


 


Glucose  99 mg/dL ()   22  05:45    


 


POC Glucose  138 mg/dL ()  H  22  21:28    


 


Calcium  8.3 mg/dL (8.4-10.2)  L  22  05:45    


 


Troponin T  < 0.010 ng/mL (0.00-0.029)   22  22:38    


 


SARS-CoV-2 (PCR)  Negative  (Negative)   08/10/22  10:30    


 


Blood Type  O POSITIVE   22  04:47    


 


Antibody Screen  Negative   22  04:47    











Portillo/IV: 


                                        





Voiding Method                   Urinal











Nutrition/Malnutrition Assess





- Dietary Evaluation


Nutrition/Malnutrition Findings: 


                                        





Nutrition Notes                                            Start:  22 

11:19


Freq:                                                      Status: Discharge    

 


Protocol:                                                                       

 


 Document     22 11:19  Critical access hospital  (Rec: 22 11:26  Critical access hospital  [id number])


 Nutrition Notes


     Need for Assessment generated from:         LOS


     Initial or Follow up                        Brief Note


     Other Pertinent Diagnosis                   s/p closed reduction (L)


                                                 shoulder


     Current Diet                                NPO (was on cardiac diet)


     Height                                      6 ft 4 in


     Weight                                      92.5 kg


     Ideal Body Weight (kg)                      91.81


     BMI                                         24.8


     Weight Status                               Appropriate


     Subjective/Other Information                Pt screened for LOS.  He has


                                                 consumed 42% of meals since


                                                 admission.  D/C summary


                                                 written this am.


     Percent of energy/protein needs met:        40% energy


                                                 38% pro


     Burn                                        Absent


     Trauma                                      Absent


     Current % PO                                Poor (25-49%)


     Minimum of two criteria                     No


     Is patient on ventilator?                   No


     Is Patient Ambulatory and/or Out of Bed     Yes


     REE-([facility]-ambulatory/OOB) [     2348.450


      [email or web address]]                               


     Calculation Used for Recommendations        [facility]


     Additional Notes                            Pro needs 1-1.2g/k-111g/


                                                 day


                                                 Fluid needs 1ml/kcal


 Nutrition Intervention


     Follow-Up By:                               22


     Additional Comments                         F/U: intakes, need for ONS

## 2022-08-12 NOTE — CONSULTATION
History of Present Illness





- Reason for Consult


Consult date: 08/12/22


Requesting physician: NANCY DUMONT





- History of Present Illness





67-year-old male with history of seizure disorder, hypertension, depression, 

hyperlipidemia and unspecified psychiatric disorder on Zyprexa had a total left 

shoulder replacement today.  Postop patient was in persistent hypotension.  No 

fever.  IV normal saline bolus was given.  Blood pressure came up from 99/81 tp 

106/71.  Anesthesia and orthopedic surgery requested the patient be admitted to 

ICU because of the persistent hypotension and prevention of patient crashing on 

the floor.  No acute events overnight.  Stable for floor transfer.





Past History


Past Medical History: GERD, hypertension, hyperlipidemia, seizures, other 

(Depression)


Past Surgical History: Other (Left shoulder replacement)


Social history: lives with family, full code


Family history: hypertension





Medications and Allergies


                                    Allergies











Allergy/AdvReac Type Severity Reaction Status Date / Time


 


escitalopram [From Lexapro] Allergy  Diarrhea Verified 08/09/22 11:43


 


sertraline [From Zoloft] Allergy  Diarrhea Verified 08/09/22 11:43











                                Home Medications











 Medication  Instructions  Recorded  Confirmed  Last Taken  Type


 


Atorvastatin Calcium [Lipitor] 20 mg PO DAILY 08/09/18 08/09/22 08/11/22 06:00 

History


 


Lacosamide [Vimpat] 200 mg PO QAM 08/09/18 08/09/22 08/11/22 06:00 History


 


Lacosamide [Vimpat] 300 mg PO QHS 08/09/18 08/11/22 08/10/22 History


 


Apixaban [Eliquis] 5 mg PO BID 08/09/22 08/11/22 08/07/22 History


 


Baclofen [Lioresal] 10 mg PO PRN PRN 08/09/22 08/11/22 Unknown History


 


FLUoxetine HCL [FLUoxetine] 60 mg PO QDAY 08/09/22 08/09/22 08/11/22 06:00 

History


 


Meloxicam [Mobic] 7.5 mg PO QDAY 08/09/22 08/09/22 Unknown History


 


Metoprolol [Lopressor TAB] 50 mg PO DAILY 08/09/22 08/09/22 08/11/22 06:00 

History


 


Naproxen Sodium [Aleve] 220 mg PO DAILY 08/09/22 08/09/22 Unknown History


 


OLANZapine [Zyprexa] 5 mg PO DAILY 08/09/22 08/09/22 08/11/22 06:00 History


 


Omeprazole 40 mg PO DAILY 08/09/22 08/09/22 08/11/22 06:00 History


 


Prazosin [Minipress] 1 mg PO DAILY 08/09/22 08/09/22 08/11/22 06:00 History











Active Meds: 


Active Medications





Atorvastatin Calcium (Atorvastatin 20 Mg Tab)  20 mg PO QHS [facility]


Baclofen (Baclofen 10 Mg Tab)  10 mg PO DAILY PRN


   PRN Reason: Muscle Spasm


Docusate Sodium (Docusate Sodium 100 Mg Cap)  100 mg PO BID [facility]


   Last Admin: 08/12/22 10:22 Dose:  100 mg


   


Enoxaparin Sodium (Enoxaparin 40 Mg/0.4 Ml Inj)  40 mg SUB-Q QDAY [facility]


   Last Admin: 08/12/22 10:18 Dose:  40 mg


   


Fluoxetine HCl (Fluoxetine 20 Mg Cap)  60 mg PO QDAY [facility]


   Last Admin: 08/12/22 10:16 Dose:  60 mg


   


Lactated Ringer's (Lactated Ringers)  1,000 mls @ 75 mls/hr IV AS DIRECT [facility]


   Last Admin: 08/12/22 10:23 Dose:  75 mls/hr


   


Phenylephrine HCl 100 mg/ (Sodium Chloride)  100 mls @ 3 mls/hr IV TITR [facility]; 

Protocol


   Last Titration: 08/11/22 20:15 Dose:  0 mcg/min, 0 mls/hr


   


Ibuprofen (Ibuprofen 600 Mg Tab)  600 mg PO Q6H PRN


   PRN Reason: Pain, Mild (1-3)


Ketorolac Tromethamine (Ketorolac 30 Mg/1 Ml Inj)  15 mg IV Q6H PRN


   PRN Reason: Pain, Moderate (4-6)


   Stop: 08/16/22 10:59


Lacosamide (Lacosamide 100 Mg Tab)  300 mg PO QHS [facility]


Lacosamide (Lacosamide 100 Mg Tab)  200 mg PO QAM [facility]


   Last Admin: 08/12/22 10:18 Dose:  200 mg


   


Metoprolol Tartrate (Metoprolol Tartrate 50 Mg Tab)  50 mg PO DAILY [facility]


Morphine Sulfate (Morphine 2 Mg/1 Ml Inj)  2 mg IV Q4H PRN


   PRN Reason: Pain, Moderate (4-6)


Olanzapine (Olanzapine 5 Mg Tab)  5 mg PO DAILY [facility]


   Last Admin: 08/12/22 10:19 Dose:  5 mg


   


Pantoprazole Sodium (Pantoprazole 40 Mg Tab)  40 mg PO DAILY [facility]


   Last Admin: 08/12/22 10:14 Dose:  40 mg


   


Prazosin HCl (Prazosin 1 Mg Cap)  1 mg PO DAILY [facility]


   Last Admin: 08/12/22 10:18 Dose:  1 mg


   


Sodium Chloride (Sodium Chloride 0.9% 10 Ml Flush Syringe)  10 ml IV PRN NR


   Stop: 08/13/22 09:59











Exam





- Constitutional


Vitals: 


                                        











Temp Pulse Resp BP Pulse Ox


 


 98.2 F   121 H  20   115/79   97 


 


 08/12/22 12:00  08/12/22 10:18  08/12/22 10:00  08/12/22 06:41  08/12/22 08:18











General appearance: Present: no acute distress, well-nourished





- EENT


Eyes: Present: PERRL, EOM intact


ENT: hearing intact, clear oral mucosa





- Neck


Neck: Present: supple, normal ROM





- Respiratory


Respiratory effort: normal


Respiratory: bilateral: CTA





Results





- Labs


CBC & Chem 7: 


                                 08/12/22 12:50





                                 08/12/22 08:20


Labs: 


                              Abnormal lab results











  08/11/22 08/12/22 08/12/22 Range/Units





  17:49 06:00 08:20 


 


Hgb     (11.8-15.2)  gm/dl


 


Hct     (35.5-45.6)  %


 


MCV   99 H   (84-94)  fl


 


MCH   33 H   (28-32)  pg


 


Plt Count   135 L   (140-440)  K/mm3


 


Lymph % (Auto)   12.9 L   (13.4-35.0)  %


 


Mono % (Auto)   8.0 H   (0.0-7.3)  %


 


Seg Neutrophils %   79.0 H   (40.0-70.0)  %


 


Seg Neutrophils #   8.2 H   (1.8-7.7)  K/mm3


 


Chloride    107.8 H  ()  mmol/L


 


Creatinine    0.7 L  (0.8-1.3)  mg/dL


 


Glucose    117 H  ()  mg/dL


 


POC Glucose  134 H    ()  mg/dL


 


Calcium    8.3 L  (8.4-10.2)  mg/dL














  08/12/22 Range/Units





  12:50 


 


Hgb  11.7 L  (11.8-15.2)  gm/dl


 


Hct  34.3 L  (35.5-45.6)  %


 


MCV   (84-94)  fl


 


MCH   (28-32)  pg


 


Plt Count   (140-440)  K/mm3


 


Lymph % (Auto)   (13.4-35.0)  %


 


Mono % (Auto)   (0.0-7.3)  %


 


Seg Neutrophils %   (40.0-70.0)  %


 


Seg Neutrophils #   (1.8-7.7)  K/mm3


 


Chloride   ()  mmol/L


 


Creatinine   (0.8-1.3)  mg/dL


 


Glucose   ()  mg/dL


 


POC Glucose   ()  mg/dL


 


Calcium   (8.4-10.2)  mg/dL














Assessment and Plan





Post Op observation from shoulder repair in patient with multiple comorbids





Transfer to floor


Resume home medication regimen

## 2022-08-12 NOTE — HISTORY AND PHYSICAL REPORT
History of Present Illness


Date of examination: 22


Date of admission: 


22 09:59





Chief complaint: 





Persistent hypotension after left shoulder replacement surgery


History of present illness: 


67-year-old male with history of seizure disorder, hypertension, depression, 

hyperlipidemia and unspecified psychiatric disorder on Zyprexa had a total left 

shoulder replacement today.  Postop patient was in persistent hypotension.  No 

fever.  IV normal saline bolus was given.  Blood pressure came up from 99/81 tp 

106/71.  Anesthesia and orthopedic surgery requested the patient be admitted to 

ICU because of the persistent hypotension and prevention of patient crashing on 

the floor.  Patient was started on Vazquez-Synephrine at aa very low-dose.  To be 

kept on hold if blood pressure is above 100 and MAP is about 80.  Intensivist 

was informed by PACU.  No fever or chills.  No other predisposing test.





Past History


Past Medical History: GERD, hypertension, hyperlipidemia, seizures, other 

(Depression)


Past Surgical History: Other (Left shoulder replacement)


Social history: lives with family, full code


Family history: hypertension





Medications and Allergies


                                    Allergies











Allergy/AdvReac Type Severity Reaction Status Date / Time


 


escitalopram [From Lexapro] Allergy  Diarrhea Verified 22 11:43


 


sertraline [From Zoloft] Allergy  Diarrhea Verified 22 11:43











                                Home Medications











 Medication  Instructions  Recorded  Confirmed  Last Taken  Type


 


Atorvastatin Calcium [Lipitor] 20 mg PO DAILY 18 06:00 

History


 


Lacosamide [Vimpat] 200 mg PO QAM 18 06:00 History


 


Lacosamide [Vimpat] 300 mg PO QHS 08/09/18 08/11/22 08/10/22 History


 


Apixaban [Eliquis] 5 mg PO BID 22 History


 


Baclofen [Lioresal] 10 mg PO PRN PRN 22 Unknown History


 


FLUoxetine HCL [FLUoxetine] 60 mg PO QDAY 22 06:00 

History


 


Meloxicam [Mobic] 7.5 mg PO QDAY 22 Unknown History


 


Metoprolol [Lopressor TAB] 50 mg PO DAILY 22 06:00 

History


 


Naproxen Sodium [Aleve] 220 mg PO DAILY 22 Unknown History


 


OLANZapine [Zyprexa] 5 mg PO DAILY 22 06:00 History


 


Omeprazole 40 mg PO DAILY 22 06:00 History


 


Prazosin [Minipress] 1 mg PO DAILY 22 06:00 History











Active Meds: 


Active Medications





Baclofen (Baclofen 10 Mg Tab)  10 mg PO PRN PRN


   PRN Reason: Muscle Spasm


Enoxaparin Sodium (Enoxaparin 40 Mg/0.4 Ml Inj)  40 mg SUB-Q QDAY DYLAN


Lactated Ringer's (Lactated Ringers)  1,000 mls @ 75 mls/hr IV AS DIRECT DYLAN


   Last Admin: 22 20:45 Dose:  75 mls/hr


   


Phenylephrine HCl 100 mg/ (Sodium Chloride)  100 mls @ 3 mls/hr IV TITR DYLAN; 

Protocol


   Last Titration: 22 20:15 Dose:  0 mcg/min, 0 mls/hr


   


Ibuprofen (Ibuprofen 600 Mg Tab)  600 mg PO Q6H PRN


   PRN Reason: Pain, Mild (1-3)


Ketorolac Tromethamine (Ketorolac 30 Mg/1 Ml Inj)  15 mg IV Q6H PRN


   PRN Reason: Pain, Moderate (4-6)


   Stop: 22 10:59


Miscellaneous Medication (Atorvastatin Calcium [Lipitor])  20 mg PO DAILY [facility]


Miscellaneous Medication (Fluoxetine Hcl [Fluoxetine])  60 mg PO QDAY [facility]


Miscellaneous Medication (Lacosamide [Vimpat])  300 mg PO QHS DYLAN


Miscellaneous Medication (Lacosamide [Vimpat])  200 mg PO QAM [facility]


Miscellaneous Medication (Omeprazole [Omeprazole])  40 mg PO DAILY [facility]


Morphine Sulfate (Morphine 2 Mg/1 Ml Inj)  2 mg IV Q4H PRN


   PRN Reason: Pain, Moderate (4-6)


Olanzapine (Olanzapine 5 Mg Tab)  5 mg PO DAILY [facility]


Prazosin HCl (Prazosin 1 Mg Cap)  1 mg PO DAILY DYLAN


Sodium Chloride (Sodium Chloride 0.9% 10 Ml Flush Syringe)  10 ml IV PRN NR


   Stop: 22 09:59











Exam





- Constitutional


Vitals: 


                                        











Temp Pulse Resp BP Pulse Ox


 


 96.9 F L  111 H  20   97/79   97 


 


 22 04:39  22 05:21  22 05:21  22 05:21  22 05:21











General appearance: Present: no acute distress, well-nourished





- EENT


Eyes: Present: PERRL


ENT: hearing intact, clear oral mucosa





- Neck


Neck: Present: supple, normal ROM





- Respiratory


Respiratory effort: normal


Respiratory: bilateral: CTA





- Cardiovascular


Heart rate: 78


Rhythm: regular


Heart Sounds: Present: S1 & S2.  Absent: rub, click





- Extremities


Extremities: pulses symmetrical, No edema


Peripheral Pulses: within normal limits





- Abdominal


General gastrointestinal: Present: soft, non-tender, non-distended, normal bowel

sounds


Male genitourinary: Present: normal





- Integumentary


Integumentary: Present: clear, warm, dry





- Musculoskeletal


Musculoskeletal: gait normal, strength equal bilaterally





- Psychiatric


Psychiatric: appropriate mood/affect, intact judgment & insight





- Neurologic


Neurologic: CNII-XII intact, moves all extremities





HEART Score





- HEART Score


History: Slightly suspicious


Age: > 65


Risk factors: 1-2 risk factors


Troponin: < normal limit





- Critical Actions


Critical Actions: 0-3 pts:0.9-1.7%risk of adverse cardiac event.Candidate for 

discharge





Results





- Labs


CBC & Chem 7: 


                                 08/10/22 00:01





                                 08/10/22 00:01


Labs: 


                             Laboratory Last Values











WBC  9.4 K/mm3 (4.5-11.0)   08/10/22  00:01    


 


RBC  4.83 M/mm3 (3.65-5.03)   08/10/22  00:01    


 


Hgb  16.1 gm/dl (11.8-15.2)  H  08/10/22  00:01    


 


Hct  47.4 % (35.5-45.6)  H  08/10/22  00:01    


 


MCV  98 fl (84-94)  H  08/10/22  00:01    


 


MCH  33 pg (28-32)  H  08/10/22  00:01    


 


MCHC  34 % (32-34)   08/10/22  00:01    


 


RDW  14.7 % (13.2-15.2)   08/10/22  00:01    


 


Plt Count  160 K/mm3 (140-440)   08/10/22  00:01    


 


Sodium  132 mmol/L (137-145)  L  08/10/22  00:01    


 


Potassium  4.1 mmol/L (3.6-5.0)   08/10/22  00:01    


 


Chloride  99.7 mmol/L ()   08/10/22  00:01    


 


Carbon Dioxide  18 mmol/L (22-30)  L  08/10/22  00:01    


 


Anion Gap  18 mmol/L  08/10/22  00:01    


 


BUN  14 mg/dL (9-20)   08/10/22  00:01    


 


Creatinine  0.8 mg/dL (0.8-1.3)   08/10/22  00:01    


 


Estimated GFR  > 60 ml/min  08/10/22  00:01    


 


BUN/Creatinine Ratio  18 %  08/10/22  00:01    


 


Glucose  124 mg/dL ()  H  08/10/22  00:01    


 


POC Glucose  134 mg/dL ()  H  22  17:49    


 


Calcium  9.0 mg/dL (8.4-10.2)   08/10/22  00:01    


 


SARS-CoV-2 (PCR)  Negative  (Negative)   08/10/22  10:30    











Portillo/IV: 


                                        





Voiding Method                   Condom Catheter











Assessment and Plan


Assessment and plan: 


Critical care statement


The high probability OF a clinically significant sudden or life-threatening 

deterioration of the cardiorespiratory system and endocrine system required my 

full and direct attention, intervention and postoperative management.  The 

aggregate critical care time was 40 minutes.  The time is in addition to time 

spent performing reported procedures but includes the followin: Data review and interpretation


2: Patient assessment and monitoring of vital signs


3: Documentation


4:: Medication orders and management


Advance Directives: Yes (Full code)


VTE prophylaxis?: Chemical


Plan of care discussed with patient/family: Yes





- Patient Problems


(1) Hypotension after procedure


Current Visit: Yes   Status: Acute   


Plan to address problem: 


Hypertension after left shoulder replacement surgery


IV normal saline bolus given


Started on Vazquez-Synephrine at a very low dose


Admit to ICU for observation and possible use of pressors








(2) Hyponatremia


Current Visit: Yes   Status: Acute   


Plan to address problem: 


IV normal saline for now








(3) Seizure disorder


Current Visit: Yes   Status: Chronic   


Plan to address problem: 


Continue Vimpat








(4) Hypertension


Current Visit: Yes   Status: Chronic   


Qualifiers: 


   Hypertension type: primary hypertension   Qualified Code(s): I10 - Essential 

(primary) hypertension   


Plan to address problem: 


Hold metoprolol for now


Because of the hypotension








(5) Depression


Current Visit: Yes   Status: Chronic   


Qualifiers: 


   Depression Type: unspecified   Qualified Code(s): F32.A - Depression, unsp

ecified   


Plan to address problem: 


Continue fluoxetine








(6) Hyperlipidemia


Current Visit: Yes   Status: Chronic   


Qualifiers: 


   Hyperlipidemia type: mixed hyperlipidemia   Qualified Code(s): E78.2 - Mixed 

hyperlipidemia   


Plan to address problem: 


Continue statins








(7) DVT prophylaxis


Current Visit: No   Status: Acute   


Plan to address problem: 


SCDs for now and GI prophylaxis








(8) Advance care planning


Current Visit: Yes   Status: Acute   


Plan to address problem: 


Disease education conducted, care plan discussed, diagnosis discussed and progno

sis discussed.  Patient acknowledged understanding with care plan.  +30





Minutes.  Patient is full code.

## 2022-08-12 NOTE — PROGRESS NOTE
Assessment and Plan





s/p left shoulder replacement


continue observation, hopefully dc when medically stable, 





Subjective


Date of service: 08/12/22


Interval history: 





currently in ICU for observation, doing ok regarding shoulder





Objective


Vital signs: 


                               Vital Signs - 12hr











  08/12/22 08/12/22 08/12/22





  01:00 01:11 01:21


 


Temperature   


 


Pulse Rate 121 H 115 H 124 H


 


Respiratory 21 15 15





Rate   


 


Respiratory   





Rate [Left Leg]   


 


Blood Pressure 110/77 110/77 110/77


 


O2 Sat by Pulse 96 97 98





Oximetry   














  08/12/22 08/12/22 08/12/22





  01:31 01:41 01:51


 


Temperature   


 


Pulse Rate 115 H 114 H 122 H


 


Respiratory 20 19 18





Rate   


 


Respiratory   





Rate [Left Leg]   


 


Blood Pressure 110/77 110/77 110/77


 


O2 Sat by Pulse 97 99 98





Oximetry   














  08/12/22 08/12/22 08/12/22





  02:00 02:11 02:21


 


Temperature   


 


Pulse Rate 107 H 119 H 116 H


 


Respiratory 18 18 19





Rate   


 


Respiratory   





Rate [Left Leg]   


 


Blood Pressure 112/71 112/71 112/71


 


O2 Sat by Pulse 97 97 98





Oximetry   














  08/12/22 08/12/22 08/12/22





  02:31 02:41 02:51


 


Temperature   


 


Pulse Rate 114 H 109 H 117 H


 


Respiratory 21 19 19





Rate   


 


Respiratory   





Rate [Left Leg]   


 


Blood Pressure 112/71 112/71 112/71


 


O2 Sat by Pulse 98 98 98





Oximetry   














  08/12/22 08/12/22 08/12/22





  03:00 03:11 03:21


 


Temperature   


 


Pulse Rate 120 H 117 H 119 H


 


Respiratory 19 18 19





Rate   


 


Respiratory 20  





Rate [Left Leg]   


 


Blood Pressure 99/81 99/81 99/81


 


O2 Sat by Pulse 99 98 98





Oximetry   














  08/12/22 08/12/22 08/12/22





  03:31 03:41 03:51


 


Temperature   


 


Pulse Rate 118 H 119 H 111 H


 


Respiratory 21 18 17





Rate   


 


Respiratory   





Rate [Left Leg]   


 


Blood Pressure 99/81 99/81 99/81


 


O2 Sat by Pulse 97 94 97





Oximetry   














  08/12/22 08/12/22 08/12/22





  04:00 04:11 04:21


 


Temperature   


 


Pulse Rate 112 H 118 H 112 H


 


Respiratory 18 21 22





Rate   


 


Respiratory   





Rate [Left Leg]   


 


Blood Pressure 106/71 106/71 106/71


 


O2 Sat by Pulse 98 96 96





Oximetry   














  08/12/22 08/12/22 08/12/22





  04:31 04:39 04:41


 


Temperature  96.9 F L 


 


Pulse Rate 112 H  112 H


 


Respiratory 24  17





Rate   


 


Respiratory   





Rate [Left Leg]   


 


Blood Pressure 106/71  106/71


 


O2 Sat by Pulse 96  95





Oximetry   














  08/12/22 08/12/22 08/12/22





  04:51 05:00 05:11


 


Temperature   


 


Pulse Rate 120 H 118 H 116 H


 


Respiratory 21 19 21





Rate   


 


Respiratory   





Rate [Left Leg]   


 


Blood Pressure 106/71 106/71 97/79


 


O2 Sat by Pulse 96 95 98





Oximetry   














  08/12/22 08/12/22 08/12/22





  05:14 05:21 05:31


 


Temperature   


 


Pulse Rate  111 H 99 H


 


Respiratory 20 20 18





Rate   


 


Respiratory   





Rate [Left Leg]   


 


Blood Pressure  97/79 97/79


 


O2 Sat by Pulse 99 97 96





Oximetry   














  08/12/22 08/12/22 08/12/22





  05:41 05:51 06:01


 


Temperature   


 


Pulse Rate 105 H 112 H 103 H


 


Respiratory 22 18 21





Rate   


 


Respiratory   





Rate [Left Leg]   


 


Blood Pressure 97/79 97/79 115/79


 


O2 Sat by Pulse 97 97 97





Oximetry   














  08/12/22 08/12/22 08/12/22





  06:11 06:21 06:31


 


Temperature   


 


Pulse Rate 114 H 128 H 111 H


 


Respiratory 18 20 20





Rate   


 


Respiratory   





Rate [Left Leg]   


 


Blood Pressure 115/79 115/79 115/79


 


O2 Sat by Pulse 96 96 97





Oximetry   














  08/12/22 08/12/22 08/12/22





  06:41 08:18 10:18


 


Temperature   


 


Pulse Rate 120 H  121 H


 


Respiratory 18  





Rate   


 


Respiratory   





Rate [Left Leg]   


 


Blood Pressure 115/79  


 


O2 Sat by Pulse 97 97 





Oximetry   











Incision: draining


Weight bearing status: as tolerated





- Labs


CBC & BMP: 


                                 08/12/22 06:00





                                 08/12/22 08:20


Labs: 


                              Abnormal lab results











  08/11/22 08/12/22 08/12/22 Range/Units





  17:49 06:00 08:20 


 


MCV   99 H   (84-94)  fl


 


MCH   33 H   (28-32)  pg


 


Plt Count   135 L   (140-440)  K/mm3


 


Lymph % (Auto)   12.9 L   (13.4-35.0)  %


 


Mono % (Auto)   8.0 H   (0.0-7.3)  %


 


Seg Neutrophils %   79.0 H   (40.0-70.0)  %


 


Seg Neutrophils #   8.2 H   (1.8-7.7)  K/mm3


 


Chloride    107.8 H  ()  mmol/L


 


Creatinine    0.7 L  (0.8-1.3)  mg/dL


 


Glucose    117 H  ()  mg/dL


 


POC Glucose  134 H    ()  mg/dL


 


Calcium    8.3 L  (8.4-10.2)  mg/dL

## 2022-08-13 RX ADMIN — MORPHINE SULFATE PRN MG: 2 INJECTION, SOLUTION INTRAMUSCULAR; INTRAVENOUS at 06:04

## 2022-08-13 RX ADMIN — METOPROLOL TARTRATE SCH MG: 25 TABLET, FILM COATED ORAL at 21:38

## 2022-08-13 RX ADMIN — DOCUSATE SODIUM SCH MG: 100 CAPSULE, LIQUID FILLED ORAL at 23:14

## 2022-08-13 RX ADMIN — ENOXAPARIN SODIUM SCH MG: 100 INJECTION SUBCUTANEOUS at 09:55

## 2022-08-13 RX ADMIN — MORPHINE SULFATE PRN MG: 2 INJECTION, SOLUTION INTRAMUSCULAR; INTRAVENOUS at 10:03

## 2022-08-13 RX ADMIN — KETOROLAC TROMETHAMINE PRN MG: 30 INJECTION, SOLUTION INTRAMUSCULAR at 13:02

## 2022-08-13 RX ADMIN — LACOSAMIDE SCH MG: 100 TABLET, FILM COATED ORAL at 23:14

## 2022-08-13 RX ADMIN — DOCUSATE SODIUM SCH MG: 100 CAPSULE, LIQUID FILLED ORAL at 09:55

## 2022-08-13 RX ADMIN — LACOSAMIDE SCH MG: 100 TABLET, FILM COATED ORAL at 09:57

## 2022-08-13 RX ADMIN — PANTOPRAZOLE SODIUM SCH MG: 40 TABLET, DELAYED RELEASE ORAL at 09:56

## 2022-08-13 RX ADMIN — METOPROLOL TARTRATE SCH MG: 25 TABLET, FILM COATED ORAL at 16:59

## 2022-08-13 RX ADMIN — PRAZOSIN HYDROCHLORIDE SCH MG: 1 CAPSULE ORAL at 10:50

## 2022-08-13 NOTE — PROGRESS NOTE
Assessment and Plan


Assessment and plan: 


History


Interval history: 


This is a 67-year-old male with A. fib, heart failure with reduced EF (20 to 

25%) s/p AICD, schizophrenia, severe depression, hypertension, hyperlipidemia, 

PVD s/p stents to bilateral lower extremities, OSVALDO, smoker who had a total left 

shoulder replacement on 8/11.  Patient was hypotensive and was started on low-

dose Vazquez-Synephrine and transferred to the ICU for closer monitoring.





Hospital course to date:





8/12: Patient has MIVF running, no acute vents reported overnight.  Systolic 

blood pressures ranging from upper 90s to 110s. Transfer to the floor today. 

Remove jose, send stat h/h due to bleeding with getting OOB with PT. 





8/13:  Patient still in moderate amt of pain. Added percocet for improved pain 

control. Changed metoprolol to 50 mg po bid dosing due to persistently elevated 

HR. anticipate d/c in next 24-48hrs. 


Assessment and plan: 





This is a 67-year-old male with A. fib, heart failure with reduced EF, AICD in 

situ, schizophrenia, severe depression, HTN, HLD, PVD, OSVALDO, nicotine abuser 

admitted s/p total left shoulder replacement on 8/11 for hypotension





Neuro: h/o schizophrenia, severe depression, seizure disorder


-Continue fluoxetine, zyprexa, vimpat


-Reorientation as needed


-Maintain sleep-wake cycle


-aspiration/seizure precautions


-As needed analgesia


-PT/OT consulted, appreciate recommendation





Cardiac: h/o HTN, HFrEF with AICD in stiu, afib, HLD, PVD s/p stents to BLE


-Hold home eliquis


-Resume home metoprolol


-Continue lipitor


-Blood pressure monitoring per protocol


-s/p vazquez-synephrine


-[facility] consulted, appreciate recommendations





Respiratory: h/o OSVALDO, smoker


-Smoking cessation counseling


-Pulmonary hygiene


-IS to bedside


-Outpatient pulm follow up


-SPO2 monitoring per protocol





GI: NAD


-24 hours + 820 ml


-PPI


-Cardiac diet


-BR: colace





: NAD


-Renally dose medications


-Avoid nephrotoxic medications





ID: NAD


-Monitor WBC and temperature curve





Endo: NAD


-Avoid hypoglycemia





Heme: NAD


-Trend CBC


-Transfuse hemoglobin less than 7


-SCDs to BLE while in bed








Hospitalist Physical





- Constitutional


Vitals: 


                                        











Temp Pulse Resp BP Pulse Ox


 


 97.6 F   121 H  22   126/81   96 


 


 08/13/22 05:59  08/13/22 05:59  08/13/22 05:59  08/13/22 05:59  08/13/22 05:59











General appearance: Present: mild distress, well-nourished





- EENT


Eyes: Present: PERRL, EOM intact


ENT: hearing intact, clear oral mucosa, dentition normal





- Neck


Neck: Present: supple, normal ROM





- Respiratory


Respiratory effort: normal





- Cardiovascular


Details: 





tachycardic





- Extremities


Extremities: no ischemia, No edema





HEART Score





- HEART Score


Age: > 65


Risk factors: 1-2 risk factors


Troponin: < normal limit





- Critical Actions


Critical Actions: 0-3 pts:0.9-1.7%risk of adverse cardiac event.Candidate for 

discharge





Results





- Labs


CBC & Chem 7: 


                                 08/12/22 12:50





                                 08/12/22 08:20


Labs: 


                             Laboratory Last Values











WBC  10.4 K/mm3 (4.5-11.0)   08/12/22  06:00    


 


RBC  3.74 M/mm3 (3.65-5.03)   08/12/22  06:00    


 


Hgb  11.7 gm/dl (11.8-15.2)  L  08/12/22  12:50    


 


Hct  34.3 % (35.5-45.6)  L  08/12/22  12:50    


 


MCV  99 fl (84-94)  H  08/12/22  06:00    


 


MCH  33 pg (28-32)  H  08/12/22  06:00    


 


MCHC  33 % (32-34)   08/12/22  06:00    


 


RDW  14.6 % (13.2-15.2)   08/12/22  06:00    


 


Plt Count  135 K/mm3 (140-440)  L  08/12/22  06:00    


 


Lymph % (Auto)  12.9 % (13.4-35.0)  L  08/12/22  06:00    


 


Mono % (Auto)  8.0 % (0.0-7.3)  H  08/12/22  06:00    


 


Eos % (Auto)  0.0 % (0.0-4.3)   08/12/22  06:00    


 


Baso % (Auto)  0.1 % (0.0-1.8)   08/12/22  06:00    


 


Lymph # (Auto)  1.3 K/mm3 (1.2-5.4)   08/12/22  06:00    


 


Mono # (Auto)  0.8 K/mm3 (0.0-0.8)   08/12/22  06:00    


 


Eos # (Auto)  0.0 K/mm3 (0.0-0.4)   08/12/22  06:00    


 


Baso # (Auto)  0.0 K/mm3 (0.0-0.1)   08/12/22  06:00    


 


Seg Neutrophils %  79.0 % (40.0-70.0)  H  08/12/22  06:00    


 


Seg Neutrophils #  8.2 K/mm3 (1.8-7.7)  H  08/12/22  06:00    


 


Sodium  140 mmol/L (137-145)  D 08/12/22  08:20    


 


Potassium  3.9 mmol/L (3.6-5.0)   08/12/22  08:20    


 


Chloride  107.8 mmol/L ()  H  08/12/22  08:20    


 


Carbon Dioxide  22 mmol/L (22-30)   08/12/22  08:20    


 


Anion Gap  14 mmol/L  08/12/22  08:20    


 


BUN  13 mg/dL (9-20)   08/12/22  08:20    


 


Creatinine  0.7 mg/dL (0.8-1.3)  L  08/12/22  08:20    


 


Estimated GFR  > 60 ml/min  08/12/22  08:20    


 


BUN/Creatinine Ratio  19 %  08/12/22  08:20    


 


Glucose  117 mg/dL ()  H  08/12/22  08:20    


 


POC Glucose  134 mg/dL ()  H  08/11/22  17:49    


 


Calcium  8.3 mg/dL (8.4-10.2)  L  08/12/22  08:20    


 


SARS-CoV-2 (PCR)  Negative  (Negative)   08/10/22  10:30    











Portillo/IV: 


                                        





Voiding Method                   Urinal











Active Medications





- Current Medications


Current Medications: 














Generic Name Dose Route Start Last Admin





  Trade Name Freq  PRN Reason Stop Dose Admin


 


Atorvastatin Calcium  20 mg  08/12/22 22:00  08/12/22 21:59





  Atorvastatin 20 Mg Tab  PO   20 mg





  QHS DYLAN   Administration


 


Baclofen  10 mg  08/12/22 05:58 





  Baclofen 10 Mg Tab  PO  





  DAILY PRN  





  Muscle Spasm  


 


Docusate Sodium  100 mg  08/12/22 10:00  08/12/22 21:59





  Docusate Sodium 100 Mg Cap  PO   100 mg





  BID DYLAN   Administration


 


Enoxaparin Sodium  40 mg  08/11/22 12:00  08/12/22 10:18





  Enoxaparin 40 Mg/0.4 Ml Inj  SUB-Q   40 mg





  QDAY DYLAN   Administration


 


Fluoxetine HCl  60 mg  08/12/22 10:00  08/12/22 10:16





  Fluoxetine 20 Mg Cap  PO   60 mg





  QDAY DYLAN   Administration


 


Ibuprofen  600 mg  08/11/22 11:00  08/12/22 19:07





  Ibuprofen 600 Mg Tab  PO   600 mg





  Q6H PRN   Administration





  Pain, Mild (1-3)  


 


Ketorolac Tromethamine  15 mg  08/11/22 11:00  08/12/22 20:36





  Ketorolac 30 Mg/1 Ml Inj  IV  08/16/22 10:59  15 mg





  Q6H PRN   Administration





  Pain, Moderate (4-6)  


 


Lacosamide  300 mg  08/12/22 22:00  08/12/22 21:59





  Lacosamide 100 Mg Tab  PO   300 mg





  QHS DYLAN   Administration


 


Lacosamide  200 mg  08/12/22 10:00  08/12/22 10:18





  Lacosamide 100 Mg Tab  PO   200 mg





  QAM DYLAN   Administration


 


Metoprolol Succinate  50 mg  08/13/22 10:00 





  Metoprolol Succinate Xl 50 Mg Tab  PO  





  QDAY DYLAN  


 


Morphine Sulfate  2 mg  08/11/22 10:00  08/13/22 06:04





  Morphine 2 Mg/1 Ml Inj  IV   2 mg





  Q4H PRN   Administration





  Pain, Moderate (4-6)  


 


Olanzapine  5 mg  08/12/22 10:00  08/12/22 10:19





  Olanzapine 5 Mg Tab  PO   5 mg





  DAILY DYLAN   Administration


 


Pantoprazole Sodium  40 mg  08/12/22 10:00  08/12/22 10:14





  Pantoprazole 40 Mg Tab  PO   40 mg





  DAILY DYLAN   Administration


 


Prazosin HCl  1 mg  08/12/22 10:00  08/12/22 10:18





  Prazosin 1 Mg Cap  PO   1 mg





  DAILY DYLAN   Administration


 


Sodium Chloride  10 ml  08/11/22 10:00 





  Sodium Chloride 0.9% 10 Ml Flush Syringe  IV  08/13/22 09:59 





  PRN NR

## 2022-08-13 NOTE — PROGRESS NOTE
Assessment and Plan





s/p left shoulder replacement


continue observation, hopefully dc when medically stable, 





Subjective


Date of service: 08/13/22


Interval history: 





moved from ICU to regular floor, resting in bed, still with some minor 

bleeding...





Objective


Vital signs: 


                               Vital Signs - 12hr











  08/13/22 08/13/22 08/13/22





  04:24 05:00 05:59


 


Temperature   97.6 F


 


Pulse Rate   121 H


 


Respiratory  20 22





Rate   


 


Blood Pressure   126/81


 


O2 Sat by Pulse 95 99 96





Oximetry   














  08/13/22 08/13/22 08/13/22





  08:45 09:56 10:50


 


Temperature   


 


Pulse Rate  124 H 124 H


 


Respiratory   





Rate   


 


Blood Pressure  117/67 117/67


 


O2 Sat by Pulse 95  





Oximetry   











Incision: draining


Weight bearing status: as tolerated





- Labs


CBC & BMP: 


                                 08/12/22 12:50





                                 08/12/22 08:20

## 2022-08-14 RX ADMIN — METOPROLOL TARTRATE SCH MG: 25 TABLET, FILM COATED ORAL at 10:03

## 2022-08-14 RX ADMIN — PANTOPRAZOLE SODIUM SCH MG: 40 TABLET, DELAYED RELEASE ORAL at 10:04

## 2022-08-14 RX ADMIN — OXYCODONE AND ACETAMINOPHEN PRN TAB: 5; 325 TABLET ORAL at 21:59

## 2022-08-14 RX ADMIN — ENOXAPARIN SODIUM SCH MG: 100 INJECTION SUBCUTANEOUS at 10:05

## 2022-08-14 RX ADMIN — METOPROLOL TARTRATE SCH MG: 25 TABLET, FILM COATED ORAL at 16:42

## 2022-08-14 RX ADMIN — PRAZOSIN HYDROCHLORIDE SCH MG: 1 CAPSULE ORAL at 10:04

## 2022-08-14 RX ADMIN — DOCUSATE SODIUM SCH MG: 100 CAPSULE, LIQUID FILLED ORAL at 21:55

## 2022-08-14 RX ADMIN — OXYCODONE AND ACETAMINOPHEN PRN TAB: 5; 325 TABLET ORAL at 10:03

## 2022-08-14 RX ADMIN — DOCUSATE SODIUM SCH MG: 100 CAPSULE, LIQUID FILLED ORAL at 10:03

## 2022-08-14 RX ADMIN — LACOSAMIDE SCH MG: 100 TABLET, FILM COATED ORAL at 10:04

## 2022-08-14 RX ADMIN — LACOSAMIDE SCH MG: 100 TABLET, FILM COATED ORAL at 21:53

## 2022-08-14 RX ADMIN — METOPROLOL TARTRATE SCH MG: 25 TABLET, FILM COATED ORAL at 21:54

## 2022-08-14 NOTE — ELECTROCARDIOGRAPH REPORT
[facility]

                                       

Test Date:    2022               Test Time:    21:48:31

Pat Name:     KAYY CHASE          Department:   

Patient ID:   [id number]          Room:         A373 1

Gender:       M                        Technician:   ALICIA

:          1955               Requested By: ASHLY PEREZ

Order Number: [id number]             Reading MD:   Karthik Ng

                                 Measurements

Intervals                              Axis          

Rate:         124                      P:            

ID:                                    QRS:          44

QRSD:         130                      T:            134

QT:           395                                    

QTc:          568                                    

                           Interpretive Statements

Atrial flutter with predominant 2:1 AV block

Nonspecific intraventricular conduction delay

Inferior infarct, old

Lateral leads are also involved

No previous ECG available for comparison

Electronically Signed On 2022 14:44:32 EDT by Karthik Ng

## 2022-08-14 NOTE — PROGRESS NOTE
Assessment and Plan


Assessment and plan: 


History


Interval history: 


This is a 67-year-old male with A. fib, heart failure with reduced EF (20 to 

25%) s/p AICD, schizophrenia, severe depression, hypertension, hyperlipidemia, 

PVD s/p stents to bilateral lower extremities, OSVALDO, smoker who had a total left 

shoulder replacement on 8/11.  Patient was hypotensive and was started on low-

dose Vazquez-Synephrine and transferred to the ICU for closer monitoring.





Hospital course to date:





8/12: Patient has MIVF running, no acute vents reported overnight.  Systolic 

blood pressures ranging from upper 90s to 110s. Transfer to the floor today. 

Remove jose, send stat h/h due to bleeding with getting OOB with PT. 





8/13:  Patient still in moderate amt of pain. Added percocet for improved pain 

control. Changed metoprolol to 50 mg po bid dosing due to persistently elevated 

HR. anticipate d/c in next 24-48hrs. 





8/14: HR better controlled. PT recommended home health PT. will consult CM for 

arrangement and discuss with CM in Am.





Assessment and plan: 





This is a 67-year-old male with A. fib, heart failure with reduced EF, AICD in 

situ, schizophrenia, severe depression, HTN, HLD, PVD, OSVALDO, nicotine abuser 

admitted s/p total left shoulder replacement on 8/11 for hypotension





Neuro: h/o schizophrenia, severe depression, seizure disorder


-Continue fluoxetine, zyprexa, vimpat


-Reorientation as needed


-Maintain sleep-wake cycle


-aspiration/seizure precautions


-As needed analgesia


-PT/OT consulted, appreciate recommendation





Cardiac: h/o HTN, HFrEF with AICD in stiu, afib, HLD, PVD s/p stents to BLE


-Hold home eliquis


-Resume home metoprolol


-Continue lipitor


-Blood pressure monitoring per protocol


-s/p vazquez-synephrine


-CCM consulted, appreciate recommendations





Respiratory: h/o OSVALDO, smoker


-Smoking cessation counseling


-Pulmonary hygiene


-IS to bedside


-Outpatient pulm follow up


-SPO2 monitoring per protocol





GI: NAD


-24 hours + 820 ml


-PPI


-Cardiac diet


-BR: colace





: NAD


-Renally dose medications


-Avoid nephrotoxic medications





ID: NAD


-Monitor WBC and temperature curve





Endo: NAD


-Avoid hypoglycemia





Heme: NAD


-Trend CBC


-Transfuse hemoglobin less than 7


-SCDs to BLE while in bed








History


Interval history: 





No acute complaints this AM. Shoulder less painful.





Hospitalist Physical





- Physical exam


Narrative exam: 





General appearance: Present: mild distress, well-nourished





- EENT


Eyes: Present: PERRL, EOM intact


ENT: hearing intact, clear oral mucosa, dentition normal





- Neck


Neck: Present: supple, normal ROM





- Respiratory


Respiratory effort: normal





- Cardiovascular


Details: 





tachycardic





- Extremities


Extremities: no ischemia, No edema





- Constitutional


Vitals: 


                                        











Temp Pulse Resp BP Pulse Ox


 


 97.8 F   102 H  18   114/56   95 


 


 08/14/22 05:45  08/14/22 05:45  08/14/22 05:45  08/14/22 05:45  08/14/22 05:45











General appearance: Present: mild distress, well-nourished





HEART Score





- HEART Score


Age: > 65


Risk factors: 1-2 risk factors


Troponin: 


                                        











Troponin T  < 0.010 ng/mL (0.00-0.029)   08/13/22  22:38    











Troponin: < normal limit





- Critical Actions


Critical Actions: 0-3 pts:0.9-1.7%risk of adverse cardiac event.Candidate for 

discharge





Results





- Labs


CBC & Chem 7: 


                                 08/12/22 12:50





                                 08/12/22 08:20


Labs: 


                             Laboratory Last Values











WBC  10.4 K/mm3 (4.5-11.0)   08/12/22  06:00    


 


RBC  3.74 M/mm3 (3.65-5.03)   08/12/22  06:00    


 


Hgb  11.7 gm/dl (11.8-15.2)  L  08/12/22  12:50    


 


Hct  34.3 % (35.5-45.6)  L  08/12/22  12:50    


 


MCV  99 fl (84-94)  H  08/12/22  06:00    


 


MCH  33 pg (28-32)  H  08/12/22  06:00    


 


MCHC  33 % (32-34)   08/12/22  06:00    


 


RDW  14.6 % (13.2-15.2)   08/12/22  06:00    


 


Plt Count  135 K/mm3 (140-440)  L  08/12/22  06:00    


 


Lymph % (Auto)  12.9 % (13.4-35.0)  L  08/12/22  06:00    


 


Mono % (Auto)  8.0 % (0.0-7.3)  H  08/12/22  06:00    


 


Eos % (Auto)  0.0 % (0.0-4.3)   08/12/22  06:00    


 


Baso % (Auto)  0.1 % (0.0-1.8)   08/12/22  06:00    


 


Lymph # (Auto)  1.3 K/mm3 (1.2-5.4)   08/12/22  06:00    


 


Mono # (Auto)  0.8 K/mm3 (0.0-0.8)   08/12/22  06:00    


 


Eos # (Auto)  0.0 K/mm3 (0.0-0.4)   08/12/22  06:00    


 


Baso # (Auto)  0.0 K/mm3 (0.0-0.1)   08/12/22  06:00    


 


Seg Neutrophils %  79.0 % (40.0-70.0)  H  08/12/22  06:00    


 


Seg Neutrophils #  8.2 K/mm3 (1.8-7.7)  H  08/12/22  06:00    


 


Sodium  140 mmol/L (137-145)  D 08/12/22  08:20    


 


Potassium  3.9 mmol/L (3.6-5.0)   08/12/22  08:20    


 


Chloride  107.8 mmol/L ()  H  08/12/22  08:20    


 


Carbon Dioxide  22 mmol/L (22-30)   08/12/22  08:20    


 


Anion Gap  14 mmol/L  08/12/22  08:20    


 


BUN  13 mg/dL (9-20)   08/12/22  08:20    


 


Creatinine  0.7 mg/dL (0.8-1.3)  L  08/12/22  08:20    


 


Estimated GFR  > 60 ml/min  08/12/22  08:20    


 


BUN/Creatinine Ratio  19 %  08/12/22  08:20    


 


Glucose  117 mg/dL ()  H  08/12/22  08:20    


 


POC Glucose  138 mg/dL ()  H  08/13/22  21:28    


 


Calcium  8.3 mg/dL (8.4-10.2)  L  08/12/22  08:20    


 


Troponin T  < 0.010 ng/mL (0.00-0.029)   08/13/22  22:38    


 


SARS-CoV-2 (PCR)  Negative  (Negative)   08/10/22  10:30    











Portillo/IV: 


                                        





Voiding Method                   Urinal











Active Medications





- Current Medications


Current Medications: 














Generic Name Dose Route Start Last Admin





  Trade Name Freq  PRN Reason Stop Dose Admin


 


Atorvastatin Calcium  20 mg  08/12/22 22:00  08/13/22 23:14





  Atorvastatin 20 Mg Tab  PO   20 mg





  QHS DYLAN   Administration


 


Baclofen  10 mg  08/12/22 05:58 





  Baclofen 10 Mg Tab  PO  





  DAILY PRN  





  Muscle Spasm  


 


Docusate Sodium  100 mg  08/12/22 10:00  08/13/22 23:14





  Docusate Sodium 100 Mg Cap  PO   100 mg





  BID DYLAN   Administration


 


Enoxaparin Sodium  40 mg  08/11/22 12:00  08/13/22 09:55





  Enoxaparin 40 Mg/0.4 Ml Inj  SUB-Q   40 mg





  QDAY DYLAN   Administration


 


Fluoxetine HCl  60 mg  08/12/22 10:00  08/13/22 09:55





  Fluoxetine 20 Mg Cap  PO   60 mg





  QDAY DYLAN   Administration


 


Ibuprofen  600 mg  08/11/22 11:00  08/12/22 19:07





  Ibuprofen 600 Mg Tab  PO   600 mg





  Q6H PRN   Administration





  Pain, Mild (1-3)  


 


Ketorolac Tromethamine  15 mg  08/11/22 11:00  08/13/22 13:02





  Ketorolac 30 Mg/1 Ml Inj  IV  08/16/22 10:59  15 mg





  Q6H PRN   Administration





  Pain, Moderate (4-6)  


 


Lacosamide  300 mg  08/12/22 22:00  08/13/22 23:14





  Lacosamide 100 Mg Tab  PO   300 mg





  QHS DYLAN   Administration


 


Lacosamide  200 mg  08/12/22 10:00  08/13/22 09:57





  Lacosamide 100 Mg Tab  PO   200 mg





  QAM DYLAN   Administration


 


Metoprolol Tartrate  25 mg  08/13/22 14:00  08/13/22 21:38





  Metoprolol Tartrate 25 Mg Tab  PO   25 mg





  TID DYLAN   Administration


 


Morphine Sulfate  2 mg  08/11/22 10:00  08/13/22 10:03





  Morphine 2 Mg/1 Ml Inj  IV   2 mg





  Q4H PRN   Administration





  Pain, Moderate (4-6)  


 


Olanzapine  5 mg  08/12/22 10:00  08/13/22 09:56





  Olanzapine 5 Mg Tab  PO   5 mg





  DAILY DYLAN   Administration


 


Oxycodone/Acetaminophen  1 tab  08/13/22 13:09 





  Oxycodone /Acetaminophen 5-325mg Tab  PO  





  Q6H PRN  





  Pain, Moderate (4-6)  


 


Pantoprazole Sodium  40 mg  08/12/22 10:00  08/13/22 09:56





  Pantoprazole 40 Mg Tab  PO   40 mg





  DAILY DYLAN   Administration


 


Prazosin HCl  1 mg  08/12/22 10:00  08/13/22 10:50





  Prazosin 1 Mg Cap  PO   1 mg





  DAILY DYLAN   Administration

## 2022-08-15 RX ADMIN — ENOXAPARIN SODIUM SCH MG: 100 INJECTION SUBCUTANEOUS at 09:49

## 2022-08-15 RX ADMIN — PANTOPRAZOLE SODIUM SCH MG: 40 TABLET, DELAYED RELEASE ORAL at 09:49

## 2022-08-15 RX ADMIN — DOCUSATE SODIUM SCH MG: 100 CAPSULE, LIQUID FILLED ORAL at 21:25

## 2022-08-15 RX ADMIN — LACOSAMIDE SCH MG: 100 TABLET, FILM COATED ORAL at 09:48

## 2022-08-15 RX ADMIN — METOPROLOL TARTRATE SCH MG: 25 TABLET, FILM COATED ORAL at 09:52

## 2022-08-15 RX ADMIN — PRAZOSIN HYDROCHLORIDE SCH MG: 1 CAPSULE ORAL at 09:48

## 2022-08-15 RX ADMIN — METOPROLOL TARTRATE SCH MG: 25 TABLET, FILM COATED ORAL at 21:26

## 2022-08-15 RX ADMIN — LACOSAMIDE SCH MG: 100 TABLET, FILM COATED ORAL at 21:25

## 2022-08-15 RX ADMIN — DOCUSATE SODIUM SCH MG: 100 CAPSULE, LIQUID FILLED ORAL at 09:50

## 2022-08-15 RX ADMIN — METOPROLOL TARTRATE SCH MG: 25 TABLET, FILM COATED ORAL at 13:58

## 2022-08-15 RX ADMIN — OXYCODONE AND ACETAMINOPHEN PRN TAB: 5; 325 TABLET ORAL at 09:48

## 2022-08-15 NOTE — PROGRESS NOTE
Assessment and Plan





s/p reverse total shoulder replacement, dislocated humeral component, will 

require closed possible open reduction left shoulder...will require medical 

clearance...





Subjective


Date of service: 08/15/22


Interval history: 





patient seen earlier where wound inspected and dressing changed, CT scan ordered

and show anterior dislocation humeral component...





Objective


Vital signs: 


                               Vital Signs - 12hr











  08/15/22 08/15/22 08/15/22





  09:52 10:00 12:32


 


Temperature   98.5 F


 


Pulse Rate 105 H  93 H


 


Respiratory   20





Rate   


 


Blood Pressure   93/57


 


O2 Sat by Pulse  94 95





Oximetry   














  08/15/22 08/15/22 08/15/22





  13:58 15:05 20:43


 


Temperature   


 


Pulse Rate 95 H  


 


Respiratory   





Rate   


 


Blood Pressure   


 


O2 Sat by Pulse  95 95





Oximetry   














  08/15/22





  21:18


 


Temperature 99.5 F


 


Pulse Rate 109 H


 


Respiratory 20





Rate 


 


Blood Pressure 99/66


 


O2 Sat by Pulse 94





Oximetry 











Incision: clean and dry





- Labs


CBC & BMP: 


                                 08/12/22 12:50





                                 08/12/22 08:20

## 2022-08-15 NOTE — DISCHARGE SUMMARY
Providers





- Providers


Date of Admission: 


08/12/22 09:00





Date of discharge: 08/15/22


Attending physician: 


ASHLY PEREZ MD





                                        





08/11/22 09:59


Consult to Case Management [CONS] Routine 


   Services Needed at Discharge: Other


   Notified:: yes


   Additional Physician Instructions: Assess Discharge needs.


Physical Therapy Evaluation and Treat [CONS] Routine 


   Comment: 


   Reason For Exam: Eval and Treat





08/11/22 17:45


Consult to Physician [CONS] Routine 


   Comment: 


   Consulting Provider: DEQUAN DAVIDSON


   Physician Instructions: 


   Reason For Exam: MEDICAL MANAGEMENT





08/11/22 18:44


Consult to Physician [CONS] Routine 


   Comment: 


   Consulting Provider: DEQUAN DAVIDSON


   Physician Instructions: 


   Reason For Exam: Hypotension,OSVALDO





08/12/22 09:36


Occupational Therapy Evaluate and Treat [CONS] Routine 


   Comment: 


   Reason For Exam: post shoulder surgery





08/14/22 12:06


Consult to Case Management [CONS] Routine 


   Services Needed at Discharge: Home Health Services


   Notified:: CM


   Comment:: home health PT











Primary care physician: 


KERA COELLO MD








Hospitalization


Reason for admission: shoulder replacement


Hospital course: 





Interval history: 


This is a 67-year-old male with A. fib, heart failure with reduced EF (20 to 

25%) s/p AICD, schizophrenia, severe depression, hypertension, hyperlipidemia, 

PVD s/p stents to bilateral lower extremities, OSVALDO, smoker who had a total left 

shoulder replacement on 8/11.  Patient was hypotensive and was started on low-

dose Vazquez-Synephrine and transferred to the ICU for closer monitoring.





Hospital course to date:





8/12: Patient has MIVF running, no acute vents reported overnight.  Systolic 

blood pressures ranging from upper 90s to 110s. Transfer to the floor today. 

Remove jose, send stat h/h due to bleeding with getting OOB with PT. 





8/13:  Patient still in moderate amt of pain. Added percocet for improved pain 

control. Changed metoprolol to 50 mg po bid dosing due to persistently elevated 

HR. anticipate d/c in next 24-48hrs. 





8/14: HR better controlled. PT recommended home health PT. will consult CM for 

arrangement and discuss with CM in Am.





8/15 Vital signs improved. HR 99 on last check. Will discharge today. Rx for 

metoprolol 50 mg po tid and rx for percocet. Patient will be discharged home 

once [facility] PT service set up by [facility]. Advised patient to follow up OP with Dr. Mitchell in his clinic and his OP primary care physician. Patient also advised to 

follow up with his outpatient cardiologist regarding his chronic atrial 

fibrillation





Assessment and plan: 





This is a 67-year-old male with A. fib, heart failure with reduced EF, AICD in 

situ, schizophrenia, severe depression, HTN, HLD, PVD, OSVALDO, nicotine abuser 

admitted s/p total left shoulder replacement on 8/11 for hypotension





Neuro: h/o schizophrenia, severe depression, seizure disorder


-Continue fluoxetine, zyprexa, vimpat


-Reorientation as needed


-Maintain sleep-wake cycle


-aspiration/seizure precautions


-As needed analgesia


-PT/OT consulted, appreciate recommendation





Cardiac: h/o HTN, HFrEF with AICD in stiu, afib, HLD, PVD s/p stents to BLE, 

post operative hypotension (resolved), chronic atrial fibrillation


- home eliquis


-Resume home metoprolol


-Continue lipitor


-Blood pressure monitoring per protocol


-s/p vazquez-synephrine


-CCM consulted, appreciate recommendations





Respiratory: h/o OSVALDO, smoker


-Smoking cessation counseling


-Pulmonary hygiene


-IS to bedside


-Outpatient pulm follow up


-SPO2 monitoring per protocol





GI: NAD


-24 hours + 820 ml


-PPI


-Cardiac diet


-BR: colace





: NAD


-Renally dose medications


-Avoid nephrotoxic medications





ID: NAD


-Monitor WBC and temperature curve





Endo: NAD


-Avoid hypoglycemia





Heme: NAD


-Trend CBC


-Transfuse hemoglobin less than 7


-SCDs to BLE while in bed





MSK: Left shoulder replacement


- shoulder surgery on 8/11 by Dr. Mitchell


- PT/OT: recommend [facility] PT service.


Disposition: 01 HOME / SELF CARE / HOMELESS


Final Discharge Diagnosis (Prints w/discharge instructions): shoulder surgery, 

chronic atrial fibrillation, post operative hypotension


Time spent for discharge: 35





Core Measure Documentation





- Palliative Care


Palliative Care/ Comfort Measures: Not Applicable





- Core Measures


Any of the following diagnoses?: none





Exam





- Physical Exam


Narrative exam: 





General appearance: Present: mild distress, well-nourished





- EENT


Eyes: Present: PERRL, EOM intact


ENT: hearing intact, clear oral mucosa, dentition normal





- Neck


Neck: Present: supple, normal ROM





- Respiratory


Respiratory effort: normal





- Cardiovascular


Details: 





tachycardic, now improved





- Extremities


Extremities: no ischemia, No edema





- Constitutional


Vitals: 


                                        











Temp Pulse Resp BP Pulse Ox


 


 98.5 F   99 H  16   104/68   93 


 


 08/15/22 04:09  08/15/22 04:09  08/15/22 05:00  08/15/22 04:09  08/15/22 05:00














Plan


Follow up with: 


KERA COELLO MD [Primary Care Provider] - 7 Days


SONIA MITCHELL MD [Staff Physician] - 7 Days


Prescriptions: 


Metoprolol [Lopressor TAB] 50 mg PO TID 30 Days #90 tablet


oxyCODONE /ACETAMINOPHEN [Percocet 5/325 mg] 1 tab PO Q6H PRN 3 Days #12 tablet


 PRN Reason: Pain, Moderate (4-6)

## 2022-08-15 NOTE — XRAY REPORT
LEFT SHOULDER 3 VIEWS



INDICATION / CLINICAL INFORMATION:

post op evaluation



COMPARISON:

Left shoulder series from 8/11/2022.

 

FINDINGS:



BONES and JOINT(S): Unchanged proximal left humeral fracture with stable positioning of the left shou
lder arthroplasty.

SOFT TISSUES: Expected postoperative changes are seen along the left shoulder with persistent probabl
e atelectasis along the mid/lower left lung. No new significant abnormality.



ADDITIONAL FINDINGS: None.



IMPRESSION:

Stable appearance of the left shoulder with additional findings as above.



Signer Name: Dane Canas MD 

Signed: 8/15/2022 2:07 PM

Workstation Name: [email or web address]

## 2022-08-15 NOTE — CAT SCAN REPORT
CT left shoulder with contrast



INDICATION / CLINICAL INFORMATION:

Left shoulder pain. Recent surgery.



TECHNIQUE:

CT of the left shoulder was performed following 80 mL Omnipaque 300 All CT scans at this location are
 performed using CT dose reduction for ALARA by means of automated exposure control. 



COMPARISON:

None available.



FINDINGS: There is postoperative change consistent with recent placement of a reverse left shoulder a
rthroplasty. There is anterior dislocation of the humeral component with respect to the glenoid prost
hesis. There is significant surrounding edema is well as anterior fluid collection with some intermix
ed gas. The fluid collection seen superior and anterior to the arthroplasty measures about 6.4 x 5.7 
cm. There is a chronic appearing comminuted fracture involving the residual proximal humerus from rem
ote injury.



Limited review of the lungs demonstrate extensive groundglass densities throughout the visualized lef
t upper and left lower lobe



IMPRESSION:

1. Anterior dislocation of the reverse shoulder arthroplasty, as above

2. 6 x 7 cm collection identified anterior and superior to the arthroplasty with intermixed fluid and
 gas. This could be secondary to recent postoperative hematoma but underlying infection cannot be exc
luded.

3. Fairly extensive groundglass opacity throughout the left lung suspicious for atypical pneumonia





Signer Name: Rosas Maynard MD 

Signed: 8/15/2022 6:12 PM

Workstation Name: [email or web address]

## 2022-08-16 RX ADMIN — LACOSAMIDE SCH MG: 100 TABLET, FILM COATED ORAL at 22:53

## 2022-08-16 RX ADMIN — METOPROLOL TARTRATE SCH MG: 25 TABLET, FILM COATED ORAL at 10:06

## 2022-08-16 RX ADMIN — LACOSAMIDE SCH MG: 100 TABLET, FILM COATED ORAL at 10:05

## 2022-08-16 RX ADMIN — PRAZOSIN HYDROCHLORIDE SCH MG: 1 CAPSULE ORAL at 10:05

## 2022-08-16 RX ADMIN — PANTOPRAZOLE SODIUM SCH MG: 40 TABLET, DELAYED RELEASE ORAL at 10:06

## 2022-08-16 RX ADMIN — CEFTRIAXONE SODIUM SCH MLS/HR: 2 INJECTION, POWDER, FOR SOLUTION INTRAMUSCULAR; INTRAVENOUS at 12:36

## 2022-08-16 RX ADMIN — METOPROLOL TARTRATE SCH MG: 25 TABLET, FILM COATED ORAL at 16:33

## 2022-08-16 RX ADMIN — OXYCODONE AND ACETAMINOPHEN PRN TAB: 5; 325 TABLET ORAL at 18:46

## 2022-08-16 RX ADMIN — METOPROLOL TARTRATE SCH: 25 TABLET, FILM COATED ORAL at 22:54

## 2022-08-16 RX ADMIN — DOCUSATE SODIUM SCH MG: 100 CAPSULE, LIQUID FILLED ORAL at 12:35

## 2022-08-16 RX ADMIN — DOCUSATE SODIUM SCH MG: 100 CAPSULE, LIQUID FILLED ORAL at 22:53

## 2022-08-16 RX ADMIN — ENOXAPARIN SODIUM SCH MG: 100 INJECTION SUBCUTANEOUS at 10:06

## 2022-08-16 NOTE — PROGRESS NOTE
Assessment and Plan


Assessment and plan: 





This is a 67-year-old male with A. fib, heart failure with reduced EF (20 to 

25%) s/p AICD, schizophrenia, severe depression, hypertension, hyperlipidemia, 

PVD s/p stents to bilateral lower extremities, OSVALDO, smoker who had a total left 

shoulder replacement on 8/11.  Patient was hypotensive and was started on low-

dose Vazquez-Synephrine and transferred to the ICU for closer monitoring.











Assessment and plan: 





Left lower lobe pneumonia 





h/o schizophrenia





severe depression


Continue fluoxetine, zyprexa





 seizure disorder


-Continue vimpat





HTN


-Blood pressure monitoring per protocol


-Resume home metoprolol





HFrEF with AICD in situ





afib


-Hold home eliquis





HLD


-Continue lipitor





PVD s/p stents to BLE





OSVALDO


-Pulmonary hygiene


-IS to bedside


-Outpatient pulm follow up


-SPO2 monitoring per protocol





Tobacco abuse


-Smoking cessation counseling





Hospital course to date:





8/12: Patient has MIVF running, no acute vents reported overnight.  Systolic 

blood pressures ranging from upper 90s to 110s. Transfer to the floor today. 

Remove jose, send stat h/h due to bleeding with getting OOB with PT. 





8/13:  Patient still in moderate amt of pain. Added percocet for improved pain c

ontrol. Changed metoprolol to 50 mg po bid dosing due to persistently elevated 

HR. anticipate d/c in next 24-48hrs. 





8/14: HR better controlled. PT recommended home health PT. will consult CM for 

arrangement and discuss with CM in Am.





8/15: CT scan ordered and show anterior dislocation humeral component.  S/p 

reverse total shoulder replacement, dislocated humeral component, will require 

closed possible open reduction left shoulder





8/16: Chest x-ray reveals left lower lobe opacity.  Patient does report cough.  

We will initiate IV antibiotics for left lower lobe pneumonia





History


Interval history: 





No new issues overnight





Hospitalist Physical





- Constitutional


Vitals: 


                                        











Temp Pulse Resp BP Pulse Ox


 


 99.0 F   113 H  22   109/70   96 


 


 08/16/22 05:26  08/16/22 05:26  08/16/22 05:26  08/16/22 05:26  08/16/22 08:44











General appearance: Present: no acute distress, well-nourished





- EENT


Eyes: Present: PERRL, EOM intact


ENT: hearing intact, clear oral mucosa, dentition normal





- Neck


Neck: Present: supple, normal ROM





- Respiratory


Respiratory effort: normal


Respiratory: bilateral: CTA





- Cardiovascular


Rhythm: regular


Heart Sounds: Present: S1 & S2.  Absent: gallop, rub





- Extremities


Extremities: no ischemia, No edema, Full ROM





- Abdominal


General gastrointestinal: soft, non-tender, non-distended, normal bowel sounds





- Integumentary


Integumentary: Present: clear, warm, dry





- Neurologic


Neurologic: CNII-XII intact, moves all extremities





HEART Score





- HEART Score


Age: > 65


Risk factors: 1-2 risk factors


Troponin: 


                                        











Troponin T  < 0.010 ng/mL (0.00-0.029)   08/13/22  22:38    











Troponin: < normal limit





- Critical Actions


Critical Actions: 0-3 pts:0.9-1.7%risk of adverse cardiac event.Candidate for 

discharge





Results





- Labs


CBC & Chem 7: 


                                 08/12/22 12:50





                                 08/12/22 08:20


Labs: 


                             Laboratory Last Values











WBC  10.4 K/mm3 (4.5-11.0)   08/12/22  06:00    


 


RBC  3.74 M/mm3 (3.65-5.03)   08/12/22  06:00    


 


Hgb  11.7 gm/dl (11.8-15.2)  L  08/12/22  12:50    


 


Hct  34.3 % (35.5-45.6)  L  08/12/22  12:50    


 


MCV  99 fl (84-94)  H  08/12/22  06:00    


 


MCH  33 pg (28-32)  H  08/12/22  06:00    


 


MCHC  33 % (32-34)   08/12/22  06:00    


 


RDW  14.6 % (13.2-15.2)   08/12/22  06:00    


 


Plt Count  135 K/mm3 (140-440)  L  08/12/22  06:00    


 


Lymph % (Auto)  12.9 % (13.4-35.0)  L  08/12/22  06:00    


 


Mono % (Auto)  8.0 % (0.0-7.3)  H  08/12/22  06:00    


 


Eos % (Auto)  0.0 % (0.0-4.3)   08/12/22  06:00    


 


Baso % (Auto)  0.1 % (0.0-1.8)   08/12/22  06:00    


 


Lymph # (Auto)  1.3 K/mm3 (1.2-5.4)   08/12/22  06:00    


 


Mono # (Auto)  0.8 K/mm3 (0.0-0.8)   08/12/22  06:00    


 


Eos # (Auto)  0.0 K/mm3 (0.0-0.4)   08/12/22  06:00    


 


Baso # (Auto)  0.0 K/mm3 (0.0-0.1)   08/12/22  06:00    


 


Seg Neutrophils %  79.0 % (40.0-70.0)  H  08/12/22  06:00    


 


Seg Neutrophils #  8.2 K/mm3 (1.8-7.7)  H  08/12/22  06:00    


 


Sodium  140 mmol/L (137-145)  D 08/12/22  08:20    


 


Potassium  3.9 mmol/L (3.6-5.0)   08/12/22  08:20    


 


Chloride  107.8 mmol/L ()  H  08/12/22  08:20    


 


Carbon Dioxide  22 mmol/L (22-30)   08/12/22  08:20    


 


Anion Gap  14 mmol/L  08/12/22  08:20    


 


BUN  13 mg/dL (9-20)   08/12/22  08:20    


 


Creatinine  0.7 mg/dL (0.8-1.3)  L  08/12/22  08:20    


 


Estimated GFR  > 60 ml/min  08/12/22  08:20    


 


BUN/Creatinine Ratio  19 %  08/12/22  08:20    


 


Glucose  117 mg/dL ()  H  08/12/22  08:20    


 


POC Glucose  138 mg/dL ()  H  08/13/22  21:28    


 


Calcium  8.3 mg/dL (8.4-10.2)  L  08/12/22  08:20    


 


Troponin T  < 0.010 ng/mL (0.00-0.029)   08/13/22  22:38    


 


SARS-CoV-2 (PCR)  Negative  (Negative)   08/10/22  10:30    











Portillo/IV: 


                                        





Voiding Method                   Condom Catheter











Active Medications





- Current Medications


Current Medications: 














Generic Name Dose Route Start Last Admin





  Trade Name Freq  PRN Reason Stop Dose Admin


 


Atorvastatin Calcium  20 mg  08/12/22 22:00  08/15/22 21:25





  Atorvastatin 20 Mg Tab  PO   20 mg





  QHS DYLAN   Administration


 


Baclofen  10 mg  08/12/22 05:58 





  Baclofen 10 Mg Tab  PO  





  DAILY PRN  





  Muscle Spasm  


 


Docusate Sodium  100 mg  08/12/22 10:00  08/15/22 21:25





  Docusate Sodium 100 Mg Cap  PO   100 mg





  BID DYLAN   Administration


 


Enoxaparin Sodium  40 mg  08/11/22 12:00  08/15/22 09:49





  Enoxaparin 40 Mg/0.4 Ml Inj  SUB-Q   40 mg





  QDAY DYLAN   Administration


 


Fluoxetine HCl  60 mg  08/12/22 10:00  08/15/22 09:49





  Fluoxetine 20 Mg Cap  PO   60 mg





  QDAY DYLAN   Administration


 


Ibuprofen  600 mg  08/11/22 11:00  08/12/22 19:07





  Ibuprofen 600 Mg Tab  PO   600 mg





  Q6H PRN   Administration





  Pain, Mild (1-3)  


 


Ketorolac Tromethamine  15 mg  08/11/22 11:00  08/13/22 13:02





  Ketorolac 30 Mg/1 Ml Inj  IV  08/16/22 10:59  15 mg





  Q6H PRN   Administration





  Pain, Moderate (4-6)  


 


Lacosamide  300 mg  08/12/22 22:00  08/15/22 21:25





  Lacosamide 100 Mg Tab  PO   300 mg





  QHS DYLAN   Administration


 


Lacosamide  200 mg  08/12/22 10:00  08/15/22 09:48





  Lacosamide 100 Mg Tab  PO   200 mg





  QAM DYLAN   Administration


 


Metoprolol Tartrate  50 mg  08/14/22 12:12  08/15/22 21:26





  Metoprolol Tartrate 25 Mg Tab  PO   50 mg





  TID DYLAN   Administration


 


Morphine Sulfate  2 mg  08/11/22 10:00  08/13/22 10:03





  Morphine 2 Mg/1 Ml Inj  IV   2 mg





  Q4H PRN   Administration





  Pain, Moderate (4-6)  


 


Olanzapine  5 mg  08/12/22 10:00  08/15/22 09:50





  Olanzapine 5 Mg Tab  PO   5 mg





  DAILY DYLAN   Administration


 


Oxycodone/Acetaminophen  1 tab  08/13/22 13:09  08/15/22 09:48





  Oxycodone /Acetaminophen 5-325mg Tab  PO   1 tab





  Q6H PRN   Administration





  Pain, Moderate (4-6)  


 


Pantoprazole Sodium  40 mg  08/12/22 10:00  08/15/22 09:49





  Pantoprazole 40 Mg Tab  PO   40 mg





  DAILY DYLAN   Administration


 


Prazosin HCl  1 mg  08/12/22 10:00  08/15/22 09:48





  Prazosin 1 Mg Cap  PO   1 mg





  DAILY DYLAN   Administration

## 2022-08-17 LAB
BASOPHILS # (AUTO): 0 K/MM3 (ref 0–0.1)
BASOPHILS NFR BLD AUTO: 0.1 % (ref 0–1.8)
BUN SERPL-MCNC: 13 MG/DL (ref 9–20)
BUN/CREAT SERPL: 19 %
CALCIUM SERPL-MCNC: 8.4 MG/DL (ref 8.4–10.2)
EOSINOPHIL # BLD AUTO: 0.1 K/MM3 (ref 0–0.4)
EOSINOPHIL NFR BLD AUTO: 1.3 % (ref 0–4.3)
HCT VFR BLD CALC: 28.8 % (ref 35.5–45.6)
HEMOLYSIS INDEX: 4
HGB BLD-MCNC: 9.8 GM/DL (ref 11.8–15.2)
LYMPHOCYTES # BLD AUTO: 1.6 K/MM3 (ref 1.2–5.4)
LYMPHOCYTES NFR BLD AUTO: 20.8 % (ref 13.4–35)
MCHC RBC AUTO-ENTMCNC: 34 % (ref 32–34)
MCV RBC AUTO: 97 FL (ref 84–94)
MONOCYTES # (AUTO): 0.9 K/MM3 (ref 0–0.8)
MONOCYTES % (AUTO): 11.6 % (ref 0–7.3)
PLATELET # BLD: 168 K/MM3 (ref 140–440)
RBC # BLD AUTO: 2.98 M/MM3 (ref 3.65–5.03)

## 2022-08-17 RX ADMIN — DOCUSATE SODIUM SCH MG: 100 CAPSULE, LIQUID FILLED ORAL at 09:22

## 2022-08-17 RX ADMIN — CEFTRIAXONE SODIUM SCH MLS/HR: 2 INJECTION, POWDER, FOR SOLUTION INTRAMUSCULAR; INTRAVENOUS at 11:37

## 2022-08-17 RX ADMIN — OXYCODONE AND ACETAMINOPHEN PRN TAB: 5; 325 TABLET ORAL at 09:14

## 2022-08-17 RX ADMIN — PANTOPRAZOLE SODIUM SCH MG: 40 TABLET, DELAYED RELEASE ORAL at 09:14

## 2022-08-17 RX ADMIN — PRAZOSIN HYDROCHLORIDE SCH MG: 1 CAPSULE ORAL at 09:13

## 2022-08-17 RX ADMIN — DOCUSATE SODIUM SCH MG: 100 CAPSULE, LIQUID FILLED ORAL at 21:11

## 2022-08-17 RX ADMIN — METOPROLOL TARTRATE SCH MG: 25 TABLET, FILM COATED ORAL at 20:41

## 2022-08-17 RX ADMIN — METOPROLOL TARTRATE SCH MG: 25 TABLET, FILM COATED ORAL at 09:14

## 2022-08-17 RX ADMIN — MORPHINE SULFATE PRN MG: 2 INJECTION, SOLUTION INTRAMUSCULAR; INTRAVENOUS at 14:01

## 2022-08-17 RX ADMIN — METOPROLOL TARTRATE SCH MG: 25 TABLET, FILM COATED ORAL at 13:55

## 2022-08-17 RX ADMIN — LACOSAMIDE SCH MG: 100 TABLET, FILM COATED ORAL at 09:14

## 2022-08-17 RX ADMIN — ENOXAPARIN SODIUM SCH MG: 100 INJECTION SUBCUTANEOUS at 09:13

## 2022-08-17 RX ADMIN — LACOSAMIDE SCH MG: 100 TABLET, FILM COATED ORAL at 21:11

## 2022-08-17 RX ADMIN — MORPHINE SULFATE PRN MG: 2 INJECTION, SOLUTION INTRAMUSCULAR; INTRAVENOUS at 20:42

## 2022-08-17 NOTE — PROGRESS NOTE
Assessment and Plan





s/p left TSR with anterior dislocation humeral component


will require closed possible open reduction left shoulder





Subjective


Date of service: 08/17/22


Interval history: 





no major c/o's noted...





Objective


Vital signs: 


                               Vital Signs - 12hr











  08/17/22 08/17/22





  05:56 12:00


 


Temperature 99.0 F 


 


Pulse Rate 106 H 


 


Respiratory 20 





Rate  


 


Blood Pressure 114/67 


 


O2 Sat by Pulse 92 98





Oximetry  











Incision: clean and dry





- Labs


CBC & BMP: 


                                 08/17/22 04:40





                                 08/17/22 04:40


Labs: 


                              Abnormal lab results











  08/17/22 08/17/22 Range/Units





  04:40 04:40 


 


RBC  2.98 L   (3.65-5.03)  M/mm3


 


Hgb  9.8 L   (11.8-15.2)  gm/dl


 


Hct  28.8 L   (35.5-45.6)  %


 


MCV  97 H   (84-94)  fl


 


MCH  33 H   (28-32)  pg


 


Mono % (Auto)  11.6 H   (0.0-7.3)  %


 


Mono # (Auto)  0.9 H   (0.0-0.8)  K/mm3


 


Sodium   133 L  (137-145)  mmol/L


 


Potassium   3.2 L  (3.6-5.0)  mmol/L


 


Carbon Dioxide   21 L  (22-30)  mmol/L


 


Creatinine   0.7 L  (0.8-1.3)  mg/dL


 


Glucose   105 H  ()  mg/dL

## 2022-08-17 NOTE — PROGRESS NOTE
Assessment and Plan


Assessment and plan: 





This is a 67-year-old male with A. fib, heart failure with reduced EF (20 to 

25%) s/p AICD, schizophrenia, severe depression, hypertension, hyperlipidemia, 

PVD s/p stents to bilateral lower extremities, OSVALDO, smoker who had a total left 

shoulder replacement on 8/11.  Patient was hypotensive and was started on low-

dose Vazquez-Synephrine and transferred to the ICU for closer monitoring.  

Hypertension has now resolved








Left lower lobe pneumonia 





h/o schizophrenia





severe depression


Continue fluoxetine, zyprexa





 seizure disorder


-Continue vimpat





HTN


-Blood pressure monitoring per protocol


-Resume home metoprolol





HFrEF with AICD in situ





afib


-Hold home eliquis





HLD


-Continue lipitor





PVD s/p stents to BLE





OSVALDO


-Pulmonary hygiene


-IS to bedside


-Outpatient pulm follow up


-SPO2 monitoring per protocol





Tobacco abuse


-Smoking cessation counseling





Hospital course to date:





8/12: Patient has MIVF running, no acute vents reported overnight.  Systolic 

blood pressures ranging from upper 90s to 110s. Transfer to the floor today. 

Remove jose, send stat h/h due to bleeding with getting OOB with PT. 





8/13:  Patient still in moderate amt of pain. Added percocet for improved pain 

control. Changed metoprolol to 50 mg po bid dosing due to persistently elevated 

HR. anticipate d/c in next 24-48hrs. 





8/14: HR better controlled. PT recommended home health PT. will consult CM for 

arrangement and discuss with CM in Am.





8/15: CT scan ordered and show anterior dislocation humeral component.  S/p 

reverse total shoulder replacement, dislocated humeral component, will require c

losed possible open reduction left shoulder





8/16: Chest x-ray reveals left lower lobe opacity.  Patient does report cough.  

We will initiate IV antibiotics for left lower lobe pneumonia





8/17: We will obtain cardiology consultation for medical clearance regarding 

close possible open reduction left shoulder.  Continue supportive care





History


Interval history: 





No new issues overnight





Hospitalist Physical





- Constitutional


Vitals: 


                                        











Temp Pulse Resp BP Pulse Ox


 


 99.0 F   106 H  20   114/67   92 


 


 08/17/22 05:56  08/17/22 05:56  08/17/22 05:56  08/17/22 05:56  08/17/22 05:56











General appearance: Present: no acute distress, well-nourished





- EENT


Eyes: Present: PERRL, EOM intact


ENT: hearing intact, clear oral mucosa, dentition normal





- Neck


Neck: Present: supple, normal ROM





- Respiratory


Respiratory effort: normal


Respiratory: bilateral: CTA





- Cardiovascular


Rhythm: regular


Heart Sounds: Present: S1 & S2.  Absent: gallop, rub





- Extremities


Extremities: no ischemia, No edema, Full ROM





- Abdominal


General gastrointestinal: soft, non-tender, non-distended, normal bowel sounds





- Integumentary


Integumentary: Present: clear, warm, dry





- Neurologic


Neurologic: CNII-XII intact, moves all extremities





HEART Score





- HEART Score


Age: > 65


Risk factors: 1-2 risk factors


Troponin: 


                                        











Troponin T  < 0.010 ng/mL (0.00-0.029)   08/13/22  22:38    











Troponin: < normal limit





- Critical Actions


Critical Actions: 0-3 pts:0.9-1.7%risk of adverse cardiac event.Candidate for 

discharge





Results





- Labs


CBC & Chem 7: 


                                 08/17/22 04:40





                                 08/17/22 04:40


Labs: 


                             Laboratory Last Values











WBC  7.9 K/mm3 (4.5-11.0)   08/17/22  04:40    


 


RBC  2.98 M/mm3 (3.65-5.03)  L  08/17/22  04:40    


 


Hgb  9.8 gm/dl (11.8-15.2)  L  08/17/22  04:40    


 


Hct  28.8 % (35.5-45.6)  L  08/17/22  04:40    


 


MCV  97 fl (84-94)  H  08/17/22  04:40    


 


MCH  33 pg (28-32)  H  08/17/22  04:40    


 


MCHC  34 % (32-34)   08/17/22  04:40    


 


RDW  14.2 % (13.2-15.2)   08/17/22  04:40    


 


Plt Count  168 K/mm3 (140-440)   08/17/22  04:40    


 


Lymph % (Auto)  20.8 % (13.4-35.0)   08/17/22  04:40    


 


Mono % (Auto)  11.6 % (0.0-7.3)  H  08/17/22  04:40    


 


Eos % (Auto)  1.3 % (0.0-4.3)   08/17/22  04:40    


 


Baso % (Auto)  0.1 % (0.0-1.8)   08/17/22  04:40    


 


Lymph # (Auto)  1.6 K/mm3 (1.2-5.4)   08/17/22  04:40    


 


Mono # (Auto)  0.9 K/mm3 (0.0-0.8)  H  08/17/22  04:40    


 


Eos # (Auto)  0.1 K/mm3 (0.0-0.4)   08/17/22  04:40    


 


Baso # (Auto)  0.0 K/mm3 (0.0-0.1)   08/17/22  04:40    


 


Seg Neutrophils %  66.2 % (40.0-70.0)   08/17/22  04:40    


 


Seg Neutrophils #  5.2 K/mm3 (1.8-7.7)   08/17/22  04:40    


 


Sodium  133 mmol/L (137-145)  L  08/17/22  04:40    


 


Potassium  3.2 mmol/L (3.6-5.0)  L  08/17/22  04:40    


 


Chloride  100.2 mmol/L ()   08/17/22  04:40    


 


Carbon Dioxide  21 mmol/L (22-30)  L  08/17/22  04:40    


 


Anion Gap  15 mmol/L  08/17/22  04:40    


 


BUN  13 mg/dL (9-20)   08/17/22  04:40    


 


Creatinine  0.7 mg/dL (0.8-1.3)  L  08/17/22  04:40    


 


Estimated GFR  > 60 ml/min  08/17/22  04:40    


 


BUN/Creatinine Ratio  19 %  08/17/22  04:40    


 


Glucose  105 mg/dL ()  H  08/17/22  04:40    


 


POC Glucose  138 mg/dL ()  H  08/13/22  21:28    


 


Calcium  8.4 mg/dL (8.4-10.2)   08/17/22  04:40    


 


Troponin T  < 0.010 ng/mL (0.00-0.029)   08/13/22  22:38    


 


SARS-CoV-2 (PCR)  Negative  (Negative)   08/10/22  10:30    











Portillo/IV: 


                                        





Voiding Method                   Toilet











Active Medications





- Current Medications


Current Medications: 














Generic Name Dose Route Start Last Admin





  Trade Name Freq  PRN Reason Stop Dose Admin


 


Atorvastatin Calcium  20 mg  08/12/22 22:00  08/16/22 22:53





  Atorvastatin 20 Mg Tab  PO   20 mg





  QHS DYLAN   Administration


 


Baclofen  10 mg  08/12/22 05:58  08/17/22 09:15





  Baclofen 10 Mg Tab  PO   10 mg





  DAILY PRN   Administration





  Muscle Spasm  


 


Docusate Sodium  100 mg  08/12/22 10:00  08/17/22 09:22





  Docusate Sodium 100 Mg Cap  PO   100 mg





  BID DYLAN   Administration


 


Enoxaparin Sodium  40 mg  08/11/22 12:00  08/17/22 09:13





  Enoxaparin 40 Mg/0.4 Ml Inj  SUB-Q   40 mg





  QDAY DYLAN   Administration


 


Fluoxetine HCl  60 mg  08/12/22 10:00  08/17/22 09:14





  Fluoxetine 20 Mg Cap  PO   60 mg





  QDAY DYLAN   Administration


 


Ceftriaxone Sodium  2 gm in 100 mls @ 200 mls/hr  08/16/22 11:00  08/16/22 12:36





  Rocephin/Ns 2 Gm/100 Ml  IV   200 mls/hr





  Q24H DYLAN   Administration


 


Ibuprofen  600 mg  08/11/22 11:00  08/12/22 19:07





  Ibuprofen 600 Mg Tab  PO   600 mg





  Q6H PRN   Administration





  Pain, Mild (1-3)  


 


Lacosamide  300 mg  08/12/22 22:00  08/16/22 22:53





  Lacosamide 100 Mg Tab  PO   300 mg





  QHS DYLAN   Administration


 


Lacosamide  200 mg  08/12/22 10:00  08/17/22 09:14





  Lacosamide 100 Mg Tab  PO   200 mg





  QAM DYLAN   Administration


 


Metoprolol Tartrate  50 mg  08/14/22 12:12  08/17/22 09:14





  Metoprolol Tartrate 25 Mg Tab  PO   50 mg





  TID DYLAN   Administration


 


Morphine Sulfate  2 mg  08/11/22 10:00  08/13/22 10:03





  Morphine 2 Mg/1 Ml Inj  IV   2 mg





  Q4H PRN   Administration





  Pain, Moderate (4-6)  


 


Olanzapine  5 mg  08/12/22 10:00  08/17/22 09:22





  Olanzapine 5 Mg Tab  PO   5 mg





  DAILY DYLAN   Administration


 


Oxycodone/Acetaminophen  1 tab  08/13/22 13:09  08/17/22 09:14





  Oxycodone /Acetaminophen 5-325mg Tab  PO   1 tab





  Q6H PRN   Administration





  Pain, Moderate (4-6)  


 


Pantoprazole Sodium  40 mg  08/12/22 10:00  08/17/22 09:14





  Pantoprazole 40 Mg Tab  PO   40 mg





  DAILY DYLAN   Administration


 


Prazosin HCl  1 mg  08/12/22 10:00  08/17/22 09:13





  Prazosin 1 Mg Cap  PO   1 mg





  DAILY DYLAN   Administration

## 2022-08-18 PROCEDURE — 0RWKXJZ REVISION OF SYNTHETIC SUBSTITUTE IN LEFT SHOULDER JOINT, EXTERNAL APPROACH: ICD-10-PCS | Performed by: ORTHOPAEDIC SURGERY

## 2022-08-18 RX ADMIN — PANTOPRAZOLE SODIUM SCH MG: 40 TABLET, DELAYED RELEASE ORAL at 09:37

## 2022-08-18 RX ADMIN — METOPROLOL TARTRATE SCH: 25 TABLET, FILM COATED ORAL at 14:30

## 2022-08-18 RX ADMIN — LACOSAMIDE SCH MG: 100 TABLET, FILM COATED ORAL at 09:41

## 2022-08-18 RX ADMIN — DOCUSATE SODIUM SCH MG: 100 CAPSULE, LIQUID FILLED ORAL at 22:07

## 2022-08-18 RX ADMIN — MORPHINE SULFATE PRN MG: 2 INJECTION, SOLUTION INTRAMUSCULAR; INTRAVENOUS at 17:37

## 2022-08-18 RX ADMIN — CEFTRIAXONE SODIUM SCH: 2 INJECTION, POWDER, FOR SOLUTION INTRAMUSCULAR; INTRAVENOUS at 17:17

## 2022-08-18 RX ADMIN — METOPROLOL TARTRATE SCH MG: 25 TABLET, FILM COATED ORAL at 09:36

## 2022-08-18 RX ADMIN — DOCUSATE SODIUM SCH MG: 100 CAPSULE, LIQUID FILLED ORAL at 09:36

## 2022-08-18 RX ADMIN — MORPHINE SULFATE PRN MG: 2 INJECTION, SOLUTION INTRAMUSCULAR; INTRAVENOUS at 09:36

## 2022-08-18 RX ADMIN — ENOXAPARIN SODIUM SCH MG: 100 INJECTION SUBCUTANEOUS at 17:37

## 2022-08-18 RX ADMIN — LACOSAMIDE SCH MG: 100 TABLET, FILM COATED ORAL at 22:07

## 2022-08-18 RX ADMIN — METOPROLOL TARTRATE SCH MG: 25 TABLET, FILM COATED ORAL at 22:07

## 2022-08-18 RX ADMIN — PRAZOSIN HYDROCHLORIDE SCH MG: 1 CAPSULE ORAL at 09:40

## 2022-08-18 RX ADMIN — MORPHINE SULFATE PRN MG: 2 INJECTION, SOLUTION INTRAMUSCULAR; INTRAVENOUS at 22:08

## 2022-08-18 NOTE — PROGRESS NOTE
Assessment and Plan





Patient denies any acute cardiac symptoms.  He had a SPECT done on 7/28/2022 

which showed a medium sized defect of severe intensity located in the basal to 

mid inferior region which was fixed suggestive of a prior infarction.  The LV 

was mildly dilated with an EF of 35%.





No further cardiac work-up needed at this time patient is determined to be 

intermediate risk for this shoulder surgery.  Please proceed with the planned 

surgery.





Please keep the potassium above 4 and magnesium above 2.


Continue metoprolol.


Hold apixaban until after the surgery restart when safe from bleeding 

standpoint.








Patient is on prazosin.  He is unsure why is he taking.  He denies BPH.  Can 

stop this medication as this can cause hypotension.











- Patient Problems


(1) Preoperative cardiovascular examination


Current Visit: Yes   Status: Acute   





(2) CAD (coronary artery disease) of artery bypass graft


Current Visit: Yes   Status: Acute   





(3) Systolic and diastolic CHF, chronic


Current Visit: Yes   Status: Acute   





(4) Atrial fibrillation and flutter


Current Visit: Yes   Status: Acute   





Subjective


Principal diagnosis: CAD, CHF, AFIB


Interval history: 





Patient doing well.  No new complaints.  Still has some left shoulder pain from 

surgery.





Objective


                                   Vital Signs











  Temp Pulse Resp BP Pulse Ox


 


 08/18/22 09:40   100 H   128/70 


 


 08/18/22 09:36   100 H   128/70 


 


 08/17/22 23:41  98.3 F  86  20  110/68  95


 


 08/17/22 23:11    20   96


 


 08/17/22 20:36  98.0 F  100 H  18  123/71  96


 


 08/17/22 13:43   107 H  18  119/69  96


 


 08/17/22 12:00      98














- Physical Examination


HEENT: Positive: PERRL


Neck: Positive: neck supple


Cardiac: Positive: irregularly irregular, S1/S2


Lungs: Positive: clear to auscultation


Neuro: Positive: Grossly Intact


Abdomen: Positive: Soft


Skin: Negative: Rash


Incision: Incision Site (Dressing present left shoulder)


Extremities: Absent: edema





- Telemetry


EKG Rhythm: Atrial Flutter

## 2022-08-18 NOTE — PROCEDURE NOTE
Date of procedure: 08/18/22


Pre-op diagnosis: Dislocated left shoulder prosthesis status post reverse 

shoulder replacemen


Post-op diagnosis: same (Same)


Procedure: 





Closed reduction left shoulder dislocation





Procedure


The patient was brought to the OR on the hospital bed following induction with 

general anesthesia the patient was then placed in a beachchair position the 

wound was expected patient did not appear to have any underlying drainage or 

infection with the arm at the side gentle traction was applied as well as a some

extension with a palpable reduction noted at the glenohumeral joint this was 

confirmed via C arm


The patient was placed in a shoulder immobilizer where he will remain for the 

next 4 to 6 weeks patient tolerated procedure there were no complications he was

taken to postanesthesia recovery


Anesthesia: MAC


Surgeon: SONIA CARDENAS (Wilbur Elam, 1st assist)


Estimated blood loss: none


Condition: stable


Disposition: PACU

## 2022-08-19 VITALS — SYSTOLIC BLOOD PRESSURE: 96 MMHG | DIASTOLIC BLOOD PRESSURE: 66 MMHG

## 2022-08-19 LAB
BASOPHILS # (AUTO): 0 K/MM3 (ref 0–0.1)
BASOPHILS NFR BLD AUTO: 0.5 % (ref 0–1.8)
BUN SERPL-MCNC: 12 MG/DL (ref 9–20)
BUN/CREAT SERPL: 17 %
CALCIUM SERPL-MCNC: 8.3 MG/DL (ref 8.4–10.2)
EOSINOPHIL # BLD AUTO: 0.2 K/MM3 (ref 0–0.4)
EOSINOPHIL NFR BLD AUTO: 2.5 % (ref 0–4.3)
HCT VFR BLD CALC: 28.9 % (ref 35.5–45.6)
HEMOLYSIS INDEX: 6
HGB BLD-MCNC: 9.7 GM/DL (ref 11.8–15.2)
LYMPHOCYTES # BLD AUTO: 1.6 K/MM3 (ref 1.2–5.4)
LYMPHOCYTES NFR BLD AUTO: 26.9 % (ref 13.4–35)
MCHC RBC AUTO-ENTMCNC: 34 % (ref 32–34)
MCV RBC AUTO: 97 FL (ref 84–94)
MONOCYTES # (AUTO): 0.6 K/MM3 (ref 0–0.8)
MONOCYTES % (AUTO): 10.7 % (ref 0–7.3)
PLATELET # BLD: 199 K/MM3 (ref 140–440)
RBC # BLD AUTO: 2.99 M/MM3 (ref 3.65–5.03)

## 2022-08-19 RX ADMIN — METOPROLOL TARTRATE SCH: 25 TABLET, FILM COATED ORAL at 14:31

## 2022-08-19 RX ADMIN — LACOSAMIDE SCH MG: 100 TABLET, FILM COATED ORAL at 10:40

## 2022-08-19 RX ADMIN — DOCUSATE SODIUM SCH MG: 100 CAPSULE, LIQUID FILLED ORAL at 10:38

## 2022-08-19 RX ADMIN — MORPHINE SULFATE PRN MG: 2 INJECTION, SOLUTION INTRAMUSCULAR; INTRAVENOUS at 14:32

## 2022-08-19 RX ADMIN — OXYCODONE AND ACETAMINOPHEN PRN TAB: 5; 325 TABLET ORAL at 10:46

## 2022-08-19 RX ADMIN — METOPROLOL TARTRATE SCH MG: 25 TABLET, FILM COATED ORAL at 10:39

## 2022-08-19 RX ADMIN — CEFTRIAXONE SODIUM SCH MLS/HR: 2 INJECTION, POWDER, FOR SOLUTION INTRAMUSCULAR; INTRAVENOUS at 13:01

## 2022-08-19 RX ADMIN — PRAZOSIN HYDROCHLORIDE SCH MG: 1 CAPSULE ORAL at 10:38

## 2022-08-19 RX ADMIN — ENOXAPARIN SODIUM SCH MG: 100 INJECTION SUBCUTANEOUS at 10:38

## 2022-08-19 RX ADMIN — PANTOPRAZOLE SODIUM SCH MG: 40 TABLET, DELAYED RELEASE ORAL at 10:39

## 2022-08-19 NOTE — PROGRESS NOTE
Assessment and Plan





Patient denies any acute cardiac symptoms.  He had a SPECT done on 7/28/2022 

which showed a medium sized defect of severe intensity located in the basal to 

mid inferior region which was fixed suggestive of a prior infarction.  The LV 

was mildly dilated with an EF of 35%.





No further cardiac work-up needed at this time patient is determined to be 

intermediate risk for this shoulder surgery.  Please proceed with the planned 

surgery.





Please keep the potassium above 4 and magnesium above 2.


Continue metoprolol.


Hold apixaban until after the surgery restart when safe from bleeding 

standpoint.





Rates are controlled, continue current medications, call with questions











- Patient Problems


(1) Preoperative cardiovascular examination


Current Visit: Yes   Status: Acute   





(2) CAD (coronary artery disease) of artery bypass graft


Current Visit: Yes   Status: Acute   





(3) Systolic and diastolic CHF, chronic


Current Visit: Yes   Status: Acute   





(4) Atrial fibrillation and flutter


Current Visit: Yes   Status: Acute   





Subjective


Date of service: 08/19/22


Principal diagnosis: CAD, CHF, AFIB


Interval history: 





Patient doing well he had his close reduction yesterday tolerated the procedure 

well rates are controlled.





Objective


                                   Vital Signs











  Temp Pulse Resp BP Pulse Ox


 


 08/19/22 05:03  98.0 F  83  20  113/73  98


 


 08/19/22 00:00    17   96


 


 08/18/22 22:07   101 H   106/61 


 


 08/18/22 21:33  97.6 F  101 H  18  106/61  97


 


 08/18/22 16:38  97.7 F  106 H  22  104/69  97


 


 08/18/22 16:25  97.7 F  102 H  22  104/69  97


 


 08/18/22 16:10  97.7 F  92 H  20  117/70  95


 


 08/18/22 15:55   89  20  110/55  97


 


 08/18/22 15:40   89  19  113/67  97


 


 08/18/22 15:25   91 H  20  117/78  98


 


 08/18/22 15:20   96 H  20  112/74  98


 


 08/18/22 15:15   94 H  20  117/76  98


 


 08/18/22 15:12  97.8 F  94 H  18  124/69  100


 


 08/18/22 12:30  98 F  79  14  116/70  95


 


 08/18/22 12:20  98 F  79  14  116/70  79 L


 


 08/18/22 12:00    20   97














- Physical Examination


HEENT: Positive: PERRL


Neck: Positive: neck supple


Cardiac: Positive: irregularly irregular, S1/S2


Lungs: Positive: clear to auscultation


Neuro: Positive: Grossly Intact


Abdomen: Positive: Soft


Skin: Negative: Rash


Incision: Incision Site (Dressing present left shoulder)


Extremities: Absent: edema





- Labs and Meds


                                       CBC











  08/19/22 Range/Units





  05:45 


 


WBC  6.0  (4.5-11.0)  K/mm3


 


RBC  2.99 L  (3.65-5.03)  M/mm3


 


Hgb  9.7 L  (11.8-15.2)  gm/dl


 


Hct  28.9 L  (35.5-45.6)  %


 


Plt Count  199  (140-440)  K/mm3


 


Lymph # (Auto)  1.6  (1.2-5.4)  K/mm3


 


Mono # (Auto)  0.6  (0.0-0.8)  K/mm3


 


Eos # (Auto)  0.2  (0.0-0.4)  K/mm3


 


Baso # (Auto)  0.0  (0.0-0.1)  K/mm3








                          Comprehensive Metabolic Panel











  08/19/22 Range/Units





  05:45 


 


Sodium  139  (137-145)  mmol/L


 


Potassium  4.0  D  (3.6-5.0)  mmol/L


 


Chloride  106.1  ()  mmol/L


 


Carbon Dioxide  21 L  (22-30)  mmol/L


 


BUN  12  (9-20)  mg/dL


 


Creatinine  0.7 L  (0.8-1.3)  mg/dL


 


Glucose  99  ()  mg/dL


 


Calcium  8.3 L  (8.4-10.2)  mg/dL














- Telemetry


EKG Rhythm: Atrial Flutter

## 2022-08-19 NOTE — XRAY REPORT
Single fluoroscopic image submitted



Indication:  Intraoperative localization



Impression:  A single image of the left shoulder was submitted for documentation purposes with radiol
ogy involvement.  Left shoulder arthroplasty relocation.  Please refer to the operative note for comp
lete details.



Fluoroscopic time: 0.1 minutes



Number of fluoroscopic images:  1



Signer Name: Abhijeet Reid MD 

Signed: 8/19/2022 7:43 AM

Workstation Name: [id number]

## 2022-08-19 NOTE — PROGRESS NOTE
Subjective


Date of service: 08/19/22


Principal diagnosis: CAD, CHF, AFIB


Interval history: 





no c/o's noted...found sleeping on side, immobilzer on...





Objective


Vital signs: 


                               Vital Signs - 12hr











  08/19/22 08/19/22





  00:00 05:03


 


Temperature  98.0 F


 


Pulse Rate  83


 


Respiratory 17 20





Rate  


 


Blood Pressure  113/73


 


O2 Sat by Pulse 96 98





Oximetry  














- Labs


CBC & BMP: 


                                 08/19/22 05:45





                                 08/19/22 05:45


Labs: 


                              Abnormal lab results











  08/19/22 08/19/22 Range/Units





  05:45 05:45 


 


RBC  2.99 L   (3.65-5.03)  M/mm3


 


Hgb  9.7 L   (11.8-15.2)  gm/dl


 


Hct  28.9 L   (35.5-45.6)  %


 


MCV  97 H   (84-94)  fl


 


MCH  33 H   (28-32)  pg


 


Mono % (Auto)  10.7 H   (0.0-7.3)  %


 


Carbon Dioxide   21 L  (22-30)  mmol/L


 


Creatinine   0.7 L  (0.8-1.3)  mg/dL


 


Calcium   8.3 L  (8.4-10.2)  mg/dL

## 2022-08-19 NOTE — DISCHARGE SUMMARY
Providers





- Providers


Date of Admission: 


08/12/22 09:00





Date of discharge: 08/19/22


Attending physician: 


KENYA SHAH





                                        





08/11/22 09:59


Consult to Case Management [CONS] Routine 


   Services Needed at Discharge: Other


   Notified:: yes


   Additional Physician Instructions: Assess Discharge needs.


Physical Therapy Evaluation and Treat [CONS] Routine 


   Comment: 


   Reason For Exam: Eval and Treat





08/11/22 17:45


Consult to Physician [CONS] Routine 


   Comment: 


   Consulting Provider: DEQUAN DAVIDSON


   Physician Instructions: 


   Reason For Exam: MEDICAL MANAGEMENT





08/11/22 18:44


Consult to Physician [CONS] Routine 


   Comment: 


   Consulting Provider: DEQUAN DAVIDSON


   Physician Instructions: 


   Reason For Exam: Hypotension,OSVALDO





08/12/22 09:36


Occupational Therapy Evaluate and Treat [CONS] Routine 


   Comment: 


   Reason For Exam: post shoulder surgery





08/14/22 12:06


Consult to Case Management [CONS] Routine 


   Services Needed at Discharge: Home Health Services


   Notified:: CM


   Comment:: home health PT





08/15/22 11:31


Occupational Therapy Evaluate and Treat [CONS] Stat 


   Comment: 


   Reason For Exam: Occupational Therapy Outpatient





08/17/22 09:50


Consult to Physician [CONS] Routine 


   Comment: 


   Consulting Provider: PORFIRIO CHIU


   Physician Instructions: 


   Reason For Exam: cardiac clearance











Primary care physician: 


KERA COELLO MD








Hospitalization


Reason for admission: shoulder replacement


Hospital course: 





This is a 67-year-old male with A. fib, heart failure with reduced EF (20 to 

25%) s/p AICD, schizophrenia, severe depression, hypertension, hyperlipidemia, 

PVD s/p stents to bilateral lower extremities, OSVALDO, smoker who had a total left 

shoulder replacement on 8/11.  Patient was hypotensive and was started on low-

dose Vazquez-Synephrine and transferred to the ICU for closer monitoring.  

Hypotension later resolved.  Patient also received IV fluid bolus/hydration.  

Patient had no evidence of fever but did have chest x-ray which revealed left 

lower lobe opacity.  Therefore, patient met criteria for septic shock with 

hypotension, tachycardia and diagnosis of pneumonia.  Patient was started on IV 

antibiotics with significant improvement.  Patient was continued on fluoxetine 

and Zyprexa for severe depression.  Patient was also continued on Vimpat for 

seizure disorder and metoprolol for hypertension.  Patient's Eliquis was held 

due to patient requiring surgery.  Patient did have some complication during the

 hospital stay and was noted to have anterior dislocation of humeral component 

seen on CT scan on 8/15.  Cardiology reevaluated the patient for medical 

clearance for surgery.  On 8/18, patient underwent closed reduction of left 

shoulder dislocation.  Patient is now stable and antibiotics have been completed

 for pneumonia.  Therefore, patient is felt to have received maximal hospital 

benefit and will be discharged home.  Dedicated discharge time 32 minutes








Hospital course by disease process 





septic shock --resolved 





left lower lobe pneumonia-resolved





h/o schizophrenia





severe depression


Continue fluoxetine, zyprexa





 seizure disorder


-Continue vimpat





HTN


-Blood pressure monitoring per protocol


-Resume home metoprolol





HFrEF with AICD in situ





afib


-Hold home eliquis





HLD


-Continue lipitor





PVD s/p stents to BLE





OSVALDO


-Pulmonary hygiene


-IS to bedside


-Outpatient pulm follow up


-SPO2 monitoring per protocol





Tobacco abuse


-Smoking cessation counseling





Hospital course to date:





8/12: Patient has MIVF running, no acute vents reported overnight.  Systolic 

blood pressures ranging from upper 90s to 110s. Transfer to the floor today. 

Remove jose, send stat h/h due to bleeding with getting OOB with PT. 





8/13:  Patient still in moderate amt of pain. Added percocet for improved pain 

control. Changed metoprolol to 50 mg po bid dosing due to persistently elevated 

HR. anticipate d/c in next 24-48hrs. 





8/14: HR better controlled. PT recommended home health PT. will consult CM for 

arrangement and discuss with CM in Am.





8/15: CT scan ordered and show anterior dislocation humeral component.  S/p 

reverse total shoulder replacement, dislocated humeral component, will require 

closed possible open reduction left shoulder





8/16: Chest x-ray reveals left lower lobe opacity.  Patient does report cough.  

We will initiate IV antibiotics for left lower lobe pneumonia





8/17: We will obtain cardiology consultation for medical clearance regarding 

close possible open reduction left shoulder.  Continue supportive care





8/18: I discussed with orthopedic surgery who is planning for closed possible 

open reduction of left shoulder later today.  Continue pain control and 

supportive care.  Continue antibiotics for pneumonia.





Disposition: 01 HOME / SELF CARE / HOMELESS


Final Discharge Diagnosis (Prints w/discharge instructions): Left shoulder 

replacement, anterior shoulder dislocation with closed reduction, sepsis, left 

lower lobe pneumonia, severe depression, schizophrenia, seizure disorder, 

hypertension, heart failure with reduced ejection fraction with AICD in situ, A.

 fib, hyperlipidemia, PVD status post stents to bilateral lower extremities, 

OSVALDO, tobacco abuse





Core Measure Documentation





- Palliative Care


Palliative Care/ Comfort Measures: Not Applicable





- Core Measures


Any of the following diagnoses?: none





Exam





- Constitutional


Vitals: 


                                        











Temp Pulse Resp BP Pulse Ox


 


 98.0 F   83   20   113/73   98 


 


 08/19/22 05:03  08/19/22 05:03  08/19/22 05:03  08/19/22 05:03  08/19/22 05:03











General appearance: Present: no acute distress, well-nourished





- EENT


Eyes: Present: PERRL


ENT: hearing intact, clear oral mucosa





- Neck


Neck: Present: supple, normal ROM





- Respiratory


Respiratory effort: normal


Respiratory: bilateral: CTA





- Cardiovascular


Heart Sounds: Present: S1 & S2.  Absent: rub, click





- Extremities


Extremities: pulses symmetrical, No edema


Peripheral Pulses: within normal limits





- Abdominal


General gastrointestinal: Present: soft, non-tender, non-distended, normal bowel

 sounds


Male genitourinary: Present: normal





- Integumentary


Integumentary: Present: clear, warm, dry





- Musculoskeletal


Musculoskeletal: gait normal, strength equal bilaterally





- Psychiatric


Psychiatric: appropriate mood/affect, intact judgment & insight





- Neurologic


Neurologic: CNII-XII intact, moves all extremities





Plan


Activity: advance as tolerated


Weight Bearing Status: Weight Bear as Tolerated


Diet: regular


Wound: per your surgeon's advice


Follow up with: 


SONIA CARDENAS MD [Staff Physician] - 7 Days


KERA COELLO MD [Primary Care Provider] - 7 Days


Prescriptions: 


Apixaban [Eliquis] 5 mg PO BID #60 tab


FLUoxetine HCL [FLUoxetine] 60 mg PO QDAY #30 tab


Baclofen [Lioresal] 10 mg PO PRN PRN #30 tab


 PRN Reason: Muscle Spasm


Metoprolol [Lopressor TAB] 50 mg PO DAILY #30 tab


Metoprolol [Lopressor TAB] 50 mg PO TID 30 Days #90 tablet


Omeprazole 40 mg PO DAILY #30 tab-cap


oxyCODONE /ACETAMINOPHEN [Percocet 5/325 mg] 1 tab PO Q6H PRN 3 Days #12 tablet


 PRN Reason: Pain, Moderate (4-6)


Prazosin 1 mg PO DAILY #30 cap


Lacosamide [Vimpat] 300 mg PO QHS #30 tab


Lacosamide [Vimpat] 200 mg PO QAM #30 tab


OLANZapine [Zyprexa] 5 mg PO DAILY #30 tab

## 2022-08-24 ENCOUNTER — HOSPITAL ENCOUNTER (OUTPATIENT)
Dept: HOSPITAL 5 - XRAY | Age: 67
Discharge: HOME | End: 2022-08-24
Attending: ORTHOPAEDIC SURGERY
Payer: MEDICARE

## 2022-08-24 DIAGNOSIS — M25.512: Primary | ICD-10-CM

## 2022-08-24 DIAGNOSIS — Z96.651: ICD-10-CM

## 2022-08-24 NOTE — XRAY REPORT
LEFT SHOULDER 3 VIEWS



INDICATION:  M25.512. 



COMPARISON: 8/15/2022



IMPRESSION:  Left shoulder reverse arthroplasty appears in anatomic alignment.  Chronic appearing fra
cture in the proximal humeral shaft is again noted. No acute fracture is appreciated. The soft tissue
s are unremarkable.



Signer Name: Teddy Ramos Jr, MD 

Signed: 8/24/2022 12:14 PM

Workstation Name: [id number]

## 2022-09-23 ENCOUNTER — HOSPITAL ENCOUNTER (OUTPATIENT)
Dept: HOSPITAL 5 - XRAY | Age: 67
Discharge: HOME | End: 2022-09-23
Attending: ORTHOPAEDIC SURGERY
Payer: MEDICARE
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
## 2022-09-23 DIAGNOSIS — S42.92XA: Primary | ICD-10-CM

## 2022-09-23 DIAGNOSIS — Z96.612: ICD-10-CM
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
## 2022-09-23 DIAGNOSIS — S42.92XS: ICD-10-CM

## 2022-09-23 NOTE — XRAY REPORT
LEFT SHOULDER 4 VIEW(S)



INDICATION / CLINICAL INFORMATION: S42.92XA FRACTURE OF LEFT SHOULDER GIRDLE,CLOSED FRACTURE.



COMPARISON: 8/24/2022; 8/18/2022; 8/15/2022

 

FINDINGS:



BONES / JOINT(S): Reverse total shoulder arthroplasty with anterior dislocation. Posterior glenoid an
d proximal humeral callus. No significant arthritis.

SOFT TISSUES: No significant abnormality.



ADDITIONAL FINDINGS: None.



IMPRESSION:

1. Anteriorly dislocated humeral arthroplasty component. Similar-appearing periglenoid and proximal h
umeral ossification/callus



Signer Name: Seymour Servin MD 

Signed: 9/23/2022 11:33 AM

Workstation Name: [email or web address] [facility] [email or web address] [id number]

## 2023-12-21 NOTE — CONSULTATION
History of Present Illness


Consult date: 08/17/22


Requesting physician: KENYA SHAH


Consult reason: pre op evaluation


History of present illness: 





67-year-old male with history of coronary artery disease status post bypass 

surgery, LV systolic dysfunction with ejection fraction of 15 to 20%, atrial 

fibrillation, seizure disorder, hypertension, depression, hyperlipidemia and 

unspecified psychiatric disorder on Zyprexa had a total left shoulder repla

cement on August 11, 2022.  Postoperatively he developed hypotension and was 

started on norepinephrine which improved it.  He was in the ICU for a day and 

transferred to the floor.





During this stay he dislocated his shoulder and needs either close reduction or 

open surgical repair.





Patient is feeling overall good.  He denies any chest pain.  He has little 

shortness of breath which is unchanged.  He denies any orthopnea PND leg 

swelling.  No syncopal events.  No history of bleeding on apixaban.








Past History


Past Medical History: CAD, GERD, heart failure, hypertension, hyperlipidemia, 

seizures, other (Depression)


Past Surgical History: CABG, Other (Left shoulder replacement)


Social history: lives with family, full code


Family history: hypertension





Medications and Allergies


                                    Allergies











Allergy/AdvReac Type Severity Reaction Status Date / Time


 


escitalopram [From Lexapro] Allergy  Diarrhea Verified 08/09/22 11:43


 


sertraline [From Zoloft] Allergy  Diarrhea Verified 08/09/22 11:43











                                Home Medications











 Medication  Instructions  Recorded  Confirmed  Last Taken  Type


 


Atorvastatin Calcium [Lipitor] 20 mg PO DAILY 08/09/18 08/09/22 08/11/22 06:00 

History


 


Lacosamide [Vimpat] 200 mg PO QAM 08/09/18 08/09/22 08/11/22 06:00 History


 


Lacosamide [Vimpat] 300 mg PO QHS 08/09/18 08/11/22 08/10/22 History


 


Apixaban [Eliquis] 5 mg PO BID 08/09/22 08/11/22 08/07/22 History


 


Baclofen [Lioresal] 10 mg PO PRN PRN 08/09/22 08/11/22 Unknown History


 


FLUoxetine HCL [FLUoxetine] 60 mg PO QDAY 08/09/22 08/09/22 08/11/22 06:00 

History


 


Meloxicam [Mobic] 7.5 mg PO QDAY 08/09/22 08/09/22 Unknown History


 


Metoprolol [Lopressor TAB] 50 mg PO DAILY 08/09/22 08/09/22 08/11/22 06:00 

History


 


Naproxen Sodium [Aleve] 220 mg PO DAILY 08/09/22 08/09/22 Unknown History


 


OLANZapine [Zyprexa] 5 mg PO DAILY 08/09/22 08/09/22 08/11/22 06:00 History


 


Omeprazole 40 mg PO DAILY 08/09/22 08/09/22 08/11/22 06:00 History


 


Prazosin [Minipress] 1 mg PO DAILY 08/09/22 08/09/22 08/11/22 06:00 History


 


Metoprolol [Lopressor TAB] 50 mg PO TID 30 Days #90 tablet 08/15/22  Unknown Rx


 


oxyCODONE /ACETAMINOPHEN [Percocet 1 tab PO Q6H PRN 3 Days #12 tablet 08/15/22  

Unknown Rx





5/325 mg]     











Active Meds: 


Active Medications





Atorvastatin Calcium (Atorvastatin 20 Mg Tab)  20 mg PO QHS [facility]


   Last Admin: 08/16/22 22:53 Dose:  20 mg


   


Baclofen (Baclofen 10 Mg Tab)  10 mg PO DAILY PRN


   PRN Reason: Muscle Spasm


   Last Admin: 08/17/22 09:15 Dose:  10 mg


   


Docusate Sodium (Docusate Sodium 100 Mg Cap)  100 mg PO BID [facility]


   Last Admin: 08/17/22 09:22 Dose:  100 mg


   


Enoxaparin Sodium (Enoxaparin 40 Mg/0.4 Ml Inj)  40 mg SUB-Q QDAY [facility]


   Last Admin: 08/17/22 09:13 Dose:  40 mg


   


Fluoxetine HCl (Fluoxetine 20 Mg Cap)  60 mg PO QDAY [facility]


   Last Admin: 08/17/22 09:14 Dose:  60 mg


   


Ceftriaxone Sodium (Rocephin/Ns 2 Gm/100 Ml)  2 gm in 100 mls @ 200 mls/hr IV 

Q24H [facility]


   Last Admin: 08/17/22 11:37 Dose:  200 mls/hr


   


Ibuprofen (Ibuprofen 600 Mg Tab)  600 mg PO Q6H PRN


   PRN Reason: Pain, Mild (1-3)


   Last Admin: 08/12/22 19:07 Dose:  600 mg


   


Lacosamide (Lacosamide 100 Mg Tab)  300 mg PO QHS [facility]


   Last Admin: 08/16/22 22:53 Dose:  300 mg


   


Lacosamide (Lacosamide 100 Mg Tab)  200 mg PO QAM [facility]


   Last Admin: 08/17/22 09:14 Dose:  200 mg


   


Metoprolol Tartrate (Metoprolol Tartrate 25 Mg Tab)  50 mg PO TID [facility]


   Last Admin: 08/17/22 09:14 Dose:  50 mg


   


Morphine Sulfate (Morphine 2 Mg/1 Ml Inj)  2 mg IV Q4H PRN


   PRN Reason: Pain, Moderate (4-6)


   Last Admin: 08/13/22 10:03 Dose:  2 mg


   


Olanzapine (Olanzapine 5 Mg Tab)  5 mg PO DAILY [facility]


   Last Admin: 08/17/22 09:22 Dose:  5 mg


   


Oxycodone/Acetaminophen (Oxycodone /Acetaminophen 5-325mg Tab)  1 tab PO Q6H PRN


   PRN Reason: Pain, Moderate (4-6)


   Last Admin: 08/17/22 09:14 Dose:  1 tab


   


Pantoprazole Sodium (Pantoprazole 40 Mg Tab)  40 mg PO DAILY [facility]


   Last Admin: 08/17/22 09:14 Dose:  40 mg


   


Prazosin HCl (Prazosin 1 Mg Cap)  1 mg PO DAILY [facility]


   Last Admin: 08/17/22 09:13 Dose:  1 mg


   











Review of Systems


Constitutional: no weight loss


Ears, nose, mouth and throat: no ear pain


Cardiovascular: dyspnea on exertion, no chest pain, no orthopnea, no 

palpitations, no edema, no syncope, no lightheadedness


Respiratory: shortness of breath, no cough


Gastrointestinal: no abdominal pain, no nausea, no vomiting


Genitourinary Male: no dysuria


Musculoskeletal: arm numbness/tingling


Neurological: no head injury


Psychiatric: no anxiety


Endocrine: no palpatations


Hematologic/Lymphatic: no easy bleeding





Physical Examination


                                   Vital Signs











Temp Pulse Resp BP Pulse Ox


 


 97.4 F L  97 H  16   148/81   97 


 


 08/10/22 10:30  08/10/22 10:30  08/10/22 10:30  08/10/22 10:30  08/10/22 10:30











General appearance: no acute distress


HEENT: Positive: PERRL


Neck: Positive: neck supple


Cardiac: Positive: irregularly irregular, S1/S2.  Negative: Audible Murmur


Lungs: Positive: clear to auscultation, No Wheeze, Rales, Rhonchi


Neuro: Positive: Grossly Intact


Abdomen: Positive: Soft


Skin: Negative: Rash


Incision: Incision Site (Dressing present left shoulder)


Extremities: Absent: edema





Results





                                 08/17/22 04:40





                                 08/17/22 04:40


                                       CBC











  08/17/22 Range/Units





  04:40 


 


WBC  7.9  (4.5-11.0)  K/mm3


 


RBC  2.98 L  (3.65-5.03)  M/mm3


 


Hgb  9.8 L  (11.8-15.2)  gm/dl


 


Hct  28.8 L  (35.5-45.6)  %


 


Plt Count  168  (140-440)  K/mm3


 


Lymph # (Auto)  1.6  (1.2-5.4)  K/mm3


 


Mono # (Auto)  0.9 H  (0.0-0.8)  K/mm3


 


Eos # (Auto)  0.1  (0.0-0.4)  K/mm3


 


Baso # (Auto)  0.0  (0.0-0.1)  K/mm3








                          Comprehensive Metabolic Panel











  08/17/22 Range/Units





  04:40 


 


Sodium  133 L  (137-145)  mmol/L


 


Potassium  3.2 L  (3.6-5.0)  mmol/L


 


Chloride  100.2  ()  mmol/L


 


Carbon Dioxide  21 L  (22-30)  mmol/L


 


BUN  13  (9-20)  mg/dL


 


Creatinine  0.7 L  (0.8-1.3)  mg/dL


 


Glucose  105 H  ()  mg/dL


 


Calcium  8.4  (8.4-10.2)  mg/dL














EKG interpretations





- EKG


Supraventricular dysrhythmia: atrial flutter (On August 13)





Assessment and Plan





Patient denies any acute cardiac symptoms.  He had a SPECT done on 7/28/2022 

which showed a medium sized defect of severe intensity located in the basal to 

mid inferior region which was fixed suggestive of a prior infarction.  The LV 

was mildly dilated with an EF of 35%.





No further cardiac work-up needed at this time patient is determined to be 

intermediate risk for this shoulder surgery.  Please proceed with the planned 

surgery.





Please keep the potassium above 4 and magnesium above 2.


Continue metoprolol.


Hold apixaban until after the surgery restart when safe from bleeding 

standpoint.


Please place the patient on telemetry





- Patient Problems


(1) Preoperative cardiovascular examination


Current Visit: Yes   Status: Acute   





(2) CAD (coronary artery disease) of artery bypass graft


Current Visit: Yes   Status: Acute   





(3) Systolic and diastolic CHF, chronic


Current Visit: Yes   Status: Acute   





(4) Atrial fibrillation and flutter


Current Visit: Yes   Status: Acute vaginal bleeding

## 2024-01-02 NOTE — PROGRESS NOTE
Assessment and Plan


Assessment and plan: 





This is a 67-year-old male with A. fib, heart failure with reduced EF (20 to 

25%) s/p AICD, schizophrenia, severe depression, hypertension, hyperlipidemia, 

PVD s/p stents to bilateral lower extremities, OSVALDO, smoker who had a total left 

shoulder replacement on 8/11.  Patient was hypotensive and was started on low-

dose Vazquez-Synephrine and transferred to the ICU for closer monitoring.  

Hypertension has now resolved








Left lower lobe pneumonia 





h/o schizophrenia





severe depression


Continue fluoxetine, zyprexa





 seizure disorder


-Continue vimpat





HTN


-Blood pressure monitoring per protocol


-Resume home metoprolol





HFrEF with AICD in situ





afib


-Hold home eliquis





HLD


-Continue lipitor





PVD s/p stents to BLE





OSVALDO


-Pulmonary hygiene


-IS to bedside


-Outpatient pulm follow up


-SPO2 monitoring per protocol





Tobacco abuse


-Smoking cessation counseling





Hospital course to date:





8/12: Patient has MIVF running, no acute vents reported overnight.  Systolic 

blood pressures ranging from upper 90s to 110s. Transfer to the floor today. 

Remove jose, send stat h/h due to bleeding with getting OOB with PT. 





8/13:  Patient still in moderate amt of pain. Added percocet for improved pain 

control. Changed metoprolol to 50 mg po bid dosing due to persistently elevated 

HR. anticipate d/c in next 24-48hrs. 





8/14: HR better controlled. PT recommended home health PT. will consult CM for 

arrangement and discuss with CM in [place].





8/15: CT scan ordered and show anterior dislocation humeral component.  S/p 

reverse total shoulder replacement, dislocated humeral component, will require c

losed possible open reduction left shoulder





8/16: Chest x-ray reveals left lower lobe opacity.  Patient does report cough.  

We will initiate IV antibiotics for left lower lobe pneumonia





8/17: We will obtain cardiology consultation for medical clearance regarding 

close possible open reduction left shoulder.  Continue supportive care





8/18: I discussed with orthopedic surgery who is planning for closed possible 

open reduction of left shoulder later today.  Continue pain control and 

supportive care.  Continue antibiotics for pneumonia.





History


Interval history: 





No new issues overnight





Hospitalist Physical





- Constitutional


Vitals: 


                                        











Temp Pulse Resp BP Pulse Ox


 


 98.3 F   100 H  20   128/70   95 


 


 08/17/22 23:41  08/18/22 09:40  08/17/22 23:41  08/18/22 09:40  08/17/22 23:41











General appearance: Present: no acute distress





- EENT


Eyes: Present: PERRL, EOM intact


ENT: hearing intact, clear oral mucosa, dentition normal





- Neck


Neck: Present: supple, normal ROM





- Respiratory


Respiratory effort: normal


Respiratory: bilateral: CTA





- Cardiovascular


Rhythm: regular


Heart Sounds: Present: S1 & S2.  Absent: gallop, rub





- Extremities


Extremities: no ischemia, No edema, Full ROM





- Abdominal


General gastrointestinal: soft, non-tender, non-distended, normal bowel sounds





- Integumentary


Integumentary: Present: clear, warm, dry





- Neurologic


Neurologic: CNII-XII intact, moves all extremities





HEART Score





- HEART Score


Age: > 65


Risk factors: 1-2 risk factors


Troponin: 


                                        











Troponin T  < 0.010 ng/mL (0.00-0.029)   08/13/22  22:38    











Troponin: < normal limit





- Critical Actions


Critical Actions: 0-3 pts:0.9-1.7%risk of adverse cardiac event.Candidate for 

discharge





Results





- Labs


CBC & Chem 7: 


                                 08/17/22 04:40





                                 08/17/22 04:40


Labs: 


                             Laboratory Last Values











WBC  7.9 K/mm3 (4.5-11.0)   08/17/22  04:40    


 


RBC  2.98 M/mm3 (3.65-5.03)  L  08/17/22  04:40    


 


Hgb  9.8 gm/dl (11.8-15.2)  L  08/17/22  04:40    


 


Hct  28.8 % (35.5-45.6)  L  08/17/22  04:40    


 


MCV  97 fl (84-94)  H  08/17/22  04:40    


 


MCH  33 pg (28-32)  H  08/17/22  04:40    


 


MCHC  34 % (32-34)   08/17/22  04:40    


 


RDW  14.2 % (13.2-15.2)   08/17/22  04:40    


 


Plt Count  168 K/mm3 (140-440)   08/17/22  04:40    


 


Lymph % (Auto)  20.8 % (13.4-35.0)   08/17/22  04:40    


 


Mono % (Auto)  11.6 % (0.0-7.3)  H  08/17/22  04:40    


 


Eos % (Auto)  1.3 % (0.0-4.3)   08/17/22  04:40    


 


Baso % (Auto)  0.1 % (0.0-1.8)   08/17/22  04:40    


 


Lymph # (Auto)  1.6 K/mm3 (1.2-5.4)   08/17/22  04:40    


 


Mono # (Auto)  0.9 K/mm3 (0.0-0.8)  H  08/17/22  04:40    


 


Eos # (Auto)  0.1 K/mm3 (0.0-0.4)   08/17/22  04:40    


 


Baso # (Auto)  0.0 K/mm3 (0.0-0.1)   08/17/22  04:40    


 


Seg Neutrophils %  66.2 % (40.0-70.0)   08/17/22  04:40    


 


Seg Neutrophils #  5.2 K/mm3 (1.8-7.7)   08/17/22  04:40    


 


Sodium  133 mmol/L (137-145)  L  08/17/22  04:40    


 


Potassium  3.2 mmol/L (3.6-5.0)  L  08/17/22  04:40    


 


Chloride  100.2 mmol/L ()   08/17/22  04:40    


 


Carbon Dioxide  21 mmol/L (22-30)  L  08/17/22  04:40    


 


Anion Gap  15 mmol/L  08/17/22  04:40    


 


BUN  13 mg/dL (9-20)   08/17/22  04:40    


 


Creatinine  0.7 mg/dL (0.8-1.3)  L  08/17/22  04:40    


 


Estimated GFR  > 60 ml/min  08/17/22  04:40    


 


BUN/Creatinine Ratio  19 %  08/17/22  04:40    


 


Glucose  105 mg/dL ()  H  08/17/22  04:40    


 


POC Glucose  138 mg/dL ()  H  08/13/22  21:28    


 


Calcium  8.4 mg/dL (8.4-10.2)   08/17/22  04:40    


 


Troponin T  < 0.010 ng/mL (0.00-0.029)   08/13/22  22:38    


 


SARS-CoV-2 (PCR)  Negative  (Negative)   08/10/22  10:30    


 


Blood Type  O POSITIVE   08/18/22  04:47    


 


Antibody Screen  Negative   08/18/22  04:47    











Portillo/IV: 


                                        





Voiding Method                   Toilet











Active Medications





- Current Medications


Current Medications: 














Generic Name Dose Route Start Last Admin





  Trade Name Freq  PRN Reason Stop Dose Admin


 


Atorvastatin Calcium  20 mg  08/12/22 22:00  08/17/22 21:11





  Atorvastatin 20 Mg Tab  PO   20 mg





  QHS DYLAN   Administration


 


Baclofen  10 mg  08/12/22 05:58  08/17/22 09:15





  Baclofen 10 Mg Tab  PO   10 mg





  DAILY PRN   Administration





  Muscle Spasm  


 


Docusate Sodium  100 mg  08/12/22 10:00  08/18/22 09:36





  Docusate Sodium 100 Mg Cap  PO   100 mg





  BID DYLAN   Administration


 


Enoxaparin Sodium  40 mg  08/11/22 12:00  08/17/22 09:13





  Enoxaparin 40 Mg/0.4 Ml Inj  SUB-Q   40 mg





  QDAY DYLAN   Administration


 


Fluoxetine HCl  60 mg  08/12/22 10:00  08/18/22 09:35





  Fluoxetine 20 Mg Cap  PO   60 mg





  QDAY DYLAN   Administration


 


Ceftriaxone Sodium  2 gm in 100 mls @ 200 mls/hr  08/16/22 11:00  08/17/22 11:37





  Rocephin/Ns 2 Gm/100 Ml  IV   200 mls/hr





  Q24H DYLAN   Administration


 


Ibuprofen  600 mg  08/11/22 11:00  08/12/22 19:07





  Ibuprofen 600 Mg Tab  PO   600 mg





  Q6H PRN   Administration





  Pain, Mild (1-3)  


 


Lacosamide  300 mg  08/12/22 22:00  08/17/22 21:11





  Lacosamide 100 Mg Tab  PO   300 mg





  QHS DYLAN   Administration


 


Lacosamide  200 mg  08/12/22 10:00  08/18/22 09:41





  Lacosamide 100 Mg Tab  PO   200 mg





  QAM DYLAN   Administration


 


Metoprolol Tartrate  50 mg  08/14/22 12:12  08/18/22 09:36





  Metoprolol Tartrate 25 Mg Tab  PO   50 mg





  TID DYLAN   Administration


 


Morphine Sulfate  2 mg  08/11/22 10:00  08/18/22 09:36





  Morphine 2 Mg/1 Ml Inj  IV   2 mg





  Q4H PRN   Administration





  Pain, Moderate (4-6)  


 


Olanzapine  5 mg  08/12/22 10:00  08/18/22 09:36





  Olanzapine 5 Mg Tab  PO   5 mg





  DAILY DYLAN   Administration


 


Oxycodone/Acetaminophen  1 tab  08/13/22 13:09  08/17/22 09:14





  Oxycodone /Acetaminophen 5-325mg Tab  PO   1 tab





  Q6H PRN   Administration





  Pain, Moderate (4-6)  


 


Pantoprazole Sodium  40 mg  08/12/22 10:00  08/18/22 09:37





  Pantoprazole 40 Mg Tab  PO   40 mg





  DAILY DYLAN   Administration


 


Prazosin HCl  1 mg  08/12/22 10:00  08/18/22 09:40





  Prazosin 1 Mg Cap  PO   1 mg





  DAILY DYLAN   Administration English